# Patient Record
Sex: MALE | Race: AMERICAN INDIAN OR ALASKA NATIVE | Employment: UNEMPLOYED | URBAN - METROPOLITAN AREA
[De-identification: names, ages, dates, MRNs, and addresses within clinical notes are randomized per-mention and may not be internally consistent; named-entity substitution may affect disease eponyms.]

---

## 2017-01-23 ENCOUNTER — ANESTHESIA EVENT (OUTPATIENT)
Dept: SURGERY | Age: 57
End: 2017-01-23
Payer: MEDICARE

## 2017-01-23 RX ORDER — BISMUTH SUBSALICYLATE 262 MG
1 TABLET,CHEWABLE ORAL DAILY
COMMUNITY

## 2017-01-24 ENCOUNTER — HOSPITAL ENCOUNTER (OUTPATIENT)
Age: 57
Setting detail: OBSERVATION
LOS: 1 days | Discharge: HOME | End: 2017-01-25
Attending: ORTHOPAEDIC SURGERY | Admitting: ORTHOPAEDIC SURGERY
Payer: MEDICARE

## 2017-01-24 ENCOUNTER — ANESTHESIA (OUTPATIENT)
Dept: SURGERY | Age: 57
End: 2017-01-24
Payer: MEDICARE

## 2017-01-24 ENCOUNTER — APPOINTMENT (OUTPATIENT)
Dept: GENERAL RADIOLOGY | Age: 57
End: 2017-01-24
Attending: ORTHOPAEDIC SURGERY
Payer: MEDICARE

## 2017-01-24 PROCEDURE — 77030018836 HC SOL IRR NACL ICUM -A: Performed by: ORTHOPAEDIC SURGERY

## 2017-01-24 PROCEDURE — 74011250637 HC RX REV CODE- 250/637: Performed by: ORTHOPAEDIC SURGERY

## 2017-01-24 PROCEDURE — 77030013079 HC BLNKT BAIR HGGR 3M -A: Performed by: ANESTHESIOLOGY

## 2017-01-24 PROCEDURE — 77030027138 HC INCENT SPIROMETER -A

## 2017-01-24 PROCEDURE — 74011250636 HC RX REV CODE- 250/636: Performed by: ORTHOPAEDIC SURGERY

## 2017-01-24 PROCEDURE — 76060000035 HC ANESTHESIA 2 TO 2.5 HR: Performed by: ORTHOPAEDIC SURGERY

## 2017-01-24 PROCEDURE — 74011250636 HC RX REV CODE- 250/636: Performed by: NURSE ANESTHETIST, CERTIFIED REGISTERED

## 2017-01-24 PROCEDURE — 74011250637 HC RX REV CODE- 250/637: Performed by: NURSE ANESTHETIST, CERTIFIED REGISTERED

## 2017-01-24 PROCEDURE — 77030003028 HC SUT VCRL J&J -A: Performed by: ORTHOPAEDIC SURGERY

## 2017-01-24 PROCEDURE — 77030018723 HC ELCTRD BLD COVD -A: Performed by: ORTHOPAEDIC SURGERY

## 2017-01-24 PROCEDURE — 77010033678 HC OXYGEN DAILY

## 2017-01-24 PROCEDURE — 99218 HC RM OBSERVATION: CPT

## 2017-01-24 PROCEDURE — 76210000016 HC OR PH I REC 1 TO 1.5 HR: Performed by: ORTHOPAEDIC SURGERY

## 2017-01-24 PROCEDURE — 77030031139 HC SUT VCRL2 J&J -A: Performed by: ORTHOPAEDIC SURGERY

## 2017-01-24 PROCEDURE — 77030032490 HC SLV COMPR SCD KNE COVD -B: Performed by: ORTHOPAEDIC SURGERY

## 2017-01-24 PROCEDURE — 77030018823 HC SLV COMPR VENO -B: Performed by: ORTHOPAEDIC SURGERY

## 2017-01-24 PROCEDURE — 77030014647 HC SEAL FBRN TISSL BAXT -D: Performed by: ORTHOPAEDIC SURGERY

## 2017-01-24 PROCEDURE — 77030003029 HC SUT VCRL J&J -B: Performed by: ORTHOPAEDIC SURGERY

## 2017-01-24 PROCEDURE — 74011250637 HC RX REV CODE- 250/637: Performed by: HOSPITALIST

## 2017-01-24 PROCEDURE — 77030011640 HC PAD GRND REM COVD -A: Performed by: ORTHOPAEDIC SURGERY

## 2017-01-24 PROCEDURE — 77030008683 HC TU ET CUF COVD -A: Performed by: ANESTHESIOLOGY

## 2017-01-24 PROCEDURE — 77030008477 HC STYL SATN SLP COVD -A: Performed by: ANESTHESIOLOGY

## 2017-01-24 PROCEDURE — 74011000258 HC RX REV CODE- 258: Performed by: ORTHOPAEDIC SURGERY

## 2017-01-24 PROCEDURE — 74011000250 HC RX REV CODE- 250: Performed by: ORTHOPAEDIC SURGERY

## 2017-01-24 PROCEDURE — 76010000131 HC OR TIME 2 TO 2.5 HR: Performed by: ORTHOPAEDIC SURGERY

## 2017-01-24 PROCEDURE — 77030004391 HC BUR FLUT MEDT -C: Performed by: ORTHOPAEDIC SURGERY

## 2017-01-24 PROCEDURE — 77030019908 HC STETH ESOPH SIMS -A: Performed by: ANESTHESIOLOGY

## 2017-01-24 PROCEDURE — 74011250636 HC RX REV CODE- 250/636

## 2017-01-24 RX ORDER — HYDROMORPHONE HYDROCHLORIDE 2 MG/ML
0.5 INJECTION, SOLUTION INTRAMUSCULAR; INTRAVENOUS; SUBCUTANEOUS
Status: DISCONTINUED | OUTPATIENT
Start: 2017-01-24 | End: 2017-01-24 | Stop reason: HOSPADM

## 2017-01-24 RX ORDER — DIAZEPAM 5 MG/1
10 TABLET ORAL ONCE
Status: COMPLETED | OUTPATIENT
Start: 2017-01-24 | End: 2017-01-24

## 2017-01-24 RX ORDER — CARVEDILOL 6.25 MG/1
6.25 TABLET ORAL 2 TIMES DAILY WITH MEALS
Status: DISCONTINUED | OUTPATIENT
Start: 2017-01-25 | End: 2017-01-25 | Stop reason: HOSPADM

## 2017-01-24 RX ORDER — ONDANSETRON 2 MG/ML
4 INJECTION INTRAMUSCULAR; INTRAVENOUS
Status: DISCONTINUED | OUTPATIENT
Start: 2017-01-24 | End: 2017-01-25 | Stop reason: HOSPADM

## 2017-01-24 RX ORDER — SUCCINYLCHOLINE CHLORIDE 20 MG/ML
INJECTION INTRAMUSCULAR; INTRAVENOUS AS NEEDED
Status: DISCONTINUED | OUTPATIENT
Start: 2017-01-24 | End: 2017-01-24 | Stop reason: HOSPADM

## 2017-01-24 RX ORDER — ALBUTEROL SULFATE 90 UG/1
2 AEROSOL, METERED RESPIRATORY (INHALATION)
Status: ON HOLD | COMMUNITY
End: 2017-01-24

## 2017-01-24 RX ORDER — FENTANYL CITRATE 50 UG/ML
50 INJECTION, SOLUTION INTRAMUSCULAR; INTRAVENOUS AS NEEDED
Status: DISCONTINUED | OUTPATIENT
Start: 2017-01-24 | End: 2017-01-24 | Stop reason: HOSPADM

## 2017-01-24 RX ORDER — DEXTROAMPHETAMINE SACCHARATE, AMPHETAMINE ASPARTATE, DEXTROAMPHETAMINE SULFATE AND AMPHETAMINE SULFATE 2.5; 2.5; 2.5; 2.5 MG/1; MG/1; MG/1; MG/1
15 TABLET ORAL 3 TIMES DAILY
COMMUNITY

## 2017-01-24 RX ORDER — SODIUM CHLORIDE 0.9 % (FLUSH) 0.9 %
5-10 SYRINGE (ML) INJECTION EVERY 8 HOURS
Status: DISCONTINUED | OUTPATIENT
Start: 2017-01-24 | End: 2017-01-25 | Stop reason: HOSPADM

## 2017-01-24 RX ORDER — SODIUM CHLORIDE, SODIUM LACTATE, POTASSIUM CHLORIDE, CALCIUM CHLORIDE 600; 310; 30; 20 MG/100ML; MG/100ML; MG/100ML; MG/100ML
75 INJECTION, SOLUTION INTRAVENOUS CONTINUOUS
Status: DISCONTINUED | OUTPATIENT
Start: 2017-01-24 | End: 2017-01-25

## 2017-01-24 RX ORDER — HYDROMORPHONE HYDROCHLORIDE 1 MG/ML
2 INJECTION, SOLUTION INTRAMUSCULAR; INTRAVENOUS; SUBCUTANEOUS
Status: DISCONTINUED | OUTPATIENT
Start: 2017-01-24 | End: 2017-01-25

## 2017-01-24 RX ORDER — DEXTROAMPHETAMINE SACCHARATE, AMPHETAMINE ASPARTATE MONOHYDRATE, DEXTROAMPHETAMINE SULFATE AND AMPHETAMINE SULFATE 2.5; 2.5; 2.5; 2.5 MG/1; MG/1; MG/1; MG/1
10 CAPSULE, EXTENDED RELEASE ORAL 2 TIMES DAILY
Status: DISCONTINUED | OUTPATIENT
Start: 2017-01-25 | End: 2017-01-25 | Stop reason: HOSPADM

## 2017-01-24 RX ORDER — FENTANYL CITRATE 50 UG/ML
INJECTION, SOLUTION INTRAMUSCULAR; INTRAVENOUS AS NEEDED
Status: DISCONTINUED | OUTPATIENT
Start: 2017-01-24 | End: 2017-01-24 | Stop reason: HOSPADM

## 2017-01-24 RX ORDER — CARBAMAZEPINE 200 MG/1
200 TABLET ORAL 3 TIMES DAILY
Status: DISCONTINUED | OUTPATIENT
Start: 2017-01-25 | End: 2017-01-25 | Stop reason: HOSPADM

## 2017-01-24 RX ORDER — LISINOPRIL 40 MG/1
40 TABLET ORAL DAILY
Status: DISCONTINUED | OUTPATIENT
Start: 2017-01-25 | End: 2017-01-25 | Stop reason: HOSPADM

## 2017-01-24 RX ORDER — LIDOCAINE HYDROCHLORIDE 20 MG/ML
INJECTION, SOLUTION EPIDURAL; INFILTRATION; INTRACAUDAL; PERINEURAL AS NEEDED
Status: DISCONTINUED | OUTPATIENT
Start: 2017-01-24 | End: 2017-01-24 | Stop reason: HOSPADM

## 2017-01-24 RX ORDER — GLYCOPYRROLATE 0.2 MG/ML
INJECTION INTRAMUSCULAR; INTRAVENOUS AS NEEDED
Status: DISCONTINUED | OUTPATIENT
Start: 2017-01-24 | End: 2017-01-24 | Stop reason: HOSPADM

## 2017-01-24 RX ORDER — FAMOTIDINE 20 MG/1
20 TABLET, FILM COATED ORAL ONCE
Status: COMPLETED | OUTPATIENT
Start: 2017-01-24 | End: 2017-01-24

## 2017-01-24 RX ORDER — BUPIVACAINE HYDROCHLORIDE 5 MG/ML
INJECTION, SOLUTION EPIDURAL; INTRACAUDAL AS NEEDED
Status: DISCONTINUED | OUTPATIENT
Start: 2017-01-24 | End: 2017-01-24 | Stop reason: HOSPADM

## 2017-01-24 RX ORDER — OXYCODONE AND ACETAMINOPHEN 10; 325 MG/1; MG/1
2 TABLET ORAL
Status: DISCONTINUED | OUTPATIENT
Start: 2017-01-24 | End: 2017-01-25 | Stop reason: HOSPADM

## 2017-01-24 RX ORDER — ONDANSETRON 2 MG/ML
INJECTION INTRAMUSCULAR; INTRAVENOUS AS NEEDED
Status: DISCONTINUED | OUTPATIENT
Start: 2017-01-24 | End: 2017-01-24 | Stop reason: HOSPADM

## 2017-01-24 RX ORDER — SODIUM CHLORIDE, SODIUM LACTATE, POTASSIUM CHLORIDE, CALCIUM CHLORIDE 600; 310; 30; 20 MG/100ML; MG/100ML; MG/100ML; MG/100ML
50 INJECTION, SOLUTION INTRAVENOUS CONTINUOUS
Status: DISCONTINUED | OUTPATIENT
Start: 2017-01-24 | End: 2017-01-24 | Stop reason: HOSPADM

## 2017-01-24 RX ORDER — NEOSTIGMINE METHYLSULFATE 5 MG/5 ML
SYRINGE (ML) INTRAVENOUS AS NEEDED
Status: DISCONTINUED | OUTPATIENT
Start: 2017-01-24 | End: 2017-01-24 | Stop reason: HOSPADM

## 2017-01-24 RX ORDER — TRAMADOL HYDROCHLORIDE 50 MG/1
50 TABLET ORAL DAILY
COMMUNITY
End: 2017-07-07

## 2017-01-24 RX ORDER — DEXAMETHASONE SODIUM PHOSPHATE 4 MG/ML
INJECTION, SOLUTION INTRA-ARTICULAR; INTRALESIONAL; INTRAMUSCULAR; INTRAVENOUS; SOFT TISSUE AS NEEDED
Status: DISCONTINUED | OUTPATIENT
Start: 2017-01-24 | End: 2017-01-24 | Stop reason: HOSPADM

## 2017-01-24 RX ORDER — THERA TABS 400 MCG
1 TAB ORAL DAILY
Status: DISCONTINUED | OUTPATIENT
Start: 2017-01-25 | End: 2017-01-25 | Stop reason: HOSPADM

## 2017-01-24 RX ORDER — DEXTROSE, SODIUM CHLORIDE, AND POTASSIUM CHLORIDE 5; .45; .15 G/100ML; G/100ML; G/100ML
100 INJECTION INTRAVENOUS CONTINUOUS
Status: DISCONTINUED | OUTPATIENT
Start: 2017-01-24 | End: 2017-01-25

## 2017-01-24 RX ORDER — SODIUM CHLORIDE 0.9 % (FLUSH) 0.9 %
5-10 SYRINGE (ML) INJECTION AS NEEDED
Status: DISCONTINUED | OUTPATIENT
Start: 2017-01-24 | End: 2017-01-25 | Stop reason: HOSPADM

## 2017-01-24 RX ORDER — PROPOFOL 10 MG/ML
INJECTION, EMULSION INTRAVENOUS AS NEEDED
Status: DISCONTINUED | OUTPATIENT
Start: 2017-01-24 | End: 2017-01-24 | Stop reason: HOSPADM

## 2017-01-24 RX ORDER — CEFAZOLIN SODIUM 2 G/50ML
2 SOLUTION INTRAVENOUS
Status: COMPLETED | OUTPATIENT
Start: 2017-01-24 | End: 2017-01-24

## 2017-01-24 RX ORDER — VANCOMYCIN HYDROCHLORIDE 1 G/20ML
INJECTION, POWDER, LYOPHILIZED, FOR SOLUTION INTRAVENOUS AS NEEDED
Status: DISCONTINUED | OUTPATIENT
Start: 2017-01-24 | End: 2017-01-24 | Stop reason: HOSPADM

## 2017-01-24 RX ORDER — ROCURONIUM BROMIDE 10 MG/ML
INJECTION, SOLUTION INTRAVENOUS AS NEEDED
Status: DISCONTINUED | OUTPATIENT
Start: 2017-01-24 | End: 2017-01-24 | Stop reason: HOSPADM

## 2017-01-24 RX ORDER — HYDROCODONE BITARTRATE AND ACETAMINOPHEN 10; 325 MG/1; MG/1
1 TABLET ORAL
COMMUNITY
End: 2017-07-07

## 2017-01-24 RX ORDER — MIDAZOLAM HYDROCHLORIDE 1 MG/ML
INJECTION, SOLUTION INTRAMUSCULAR; INTRAVENOUS AS NEEDED
Status: DISCONTINUED | OUTPATIENT
Start: 2017-01-24 | End: 2017-01-24 | Stop reason: HOSPADM

## 2017-01-24 RX ORDER — ALBUTEROL SULFATE 90 UG/1
2 AEROSOL, METERED RESPIRATORY (INHALATION)
COMMUNITY

## 2017-01-24 RX ORDER — HYDROMORPHONE HYDROCHLORIDE 2 MG/ML
INJECTION, SOLUTION INTRAMUSCULAR; INTRAVENOUS; SUBCUTANEOUS AS NEEDED
Status: DISCONTINUED | OUTPATIENT
Start: 2017-01-24 | End: 2017-01-24 | Stop reason: HOSPADM

## 2017-01-24 RX ORDER — ACETAMINOPHEN 325 MG/1
650 TABLET ORAL
Status: DISCONTINUED | OUTPATIENT
Start: 2017-01-24 | End: 2017-01-25 | Stop reason: HOSPADM

## 2017-01-24 RX ADMIN — HYDROMORPHONE HYDROCHLORIDE 1 MG: 2 INJECTION, SOLUTION INTRAMUSCULAR; INTRAVENOUS; SUBCUTANEOUS at 13:11

## 2017-01-24 RX ADMIN — HYDROMORPHONE HYDROCHLORIDE 0.5 MG: 2 INJECTION INTRAMUSCULAR; INTRAVENOUS; SUBCUTANEOUS at 16:05

## 2017-01-24 RX ADMIN — PROPOFOL 200 MG: 10 INJECTION, EMULSION INTRAVENOUS at 13:17

## 2017-01-24 RX ADMIN — ROCURONIUM BROMIDE 50 MG: 10 INJECTION, SOLUTION INTRAVENOUS at 13:25

## 2017-01-24 RX ADMIN — GLYCOPYRROLATE 0.4 MG: 0.2 INJECTION INTRAMUSCULAR; INTRAVENOUS at 15:19

## 2017-01-24 RX ADMIN — HYDROMORPHONE HYDROCHLORIDE 1 MG: 2 INJECTION, SOLUTION INTRAMUSCULAR; INTRAVENOUS; SUBCUTANEOUS at 13:16

## 2017-01-24 RX ADMIN — HYDROMORPHONE HYDROCHLORIDE 2 MG: 1 INJECTION, SOLUTION INTRAMUSCULAR; INTRAVENOUS; SUBCUTANEOUS at 20:44

## 2017-01-24 RX ADMIN — SODIUM CHLORIDE, SODIUM LACTATE, POTASSIUM CHLORIDE, AND CALCIUM CHLORIDE 75 ML/HR: 600; 310; 30; 20 INJECTION, SOLUTION INTRAVENOUS at 11:04

## 2017-01-24 RX ADMIN — FENTANYL CITRATE 50 MCG: 50 INJECTION, SOLUTION INTRAMUSCULAR; INTRAVENOUS at 15:40

## 2017-01-24 RX ADMIN — DIAZEPAM 10 MG: 5 TABLET ORAL at 22:41

## 2017-01-24 RX ADMIN — FENTANYL CITRATE 100 MCG: 50 INJECTION, SOLUTION INTRAMUSCULAR; INTRAVENOUS at 15:35

## 2017-01-24 RX ADMIN — ONDANSETRON 4 MG: 2 INJECTION INTRAMUSCULAR; INTRAVENOUS at 13:14

## 2017-01-24 RX ADMIN — SUCCINYLCHOLINE CHLORIDE 100 MG: 20 INJECTION INTRAMUSCULAR; INTRAVENOUS at 13:17

## 2017-01-24 RX ADMIN — Medication 3 MG: at 15:19

## 2017-01-24 RX ADMIN — FAMOTIDINE 20 MG: 20 TABLET ORAL at 10:59

## 2017-01-24 RX ADMIN — ROCURONIUM BROMIDE 20 MG: 10 INJECTION, SOLUTION INTRAVENOUS at 13:45

## 2017-01-24 RX ADMIN — FENTANYL CITRATE 100 MCG: 50 INJECTION, SOLUTION INTRAMUSCULAR; INTRAVENOUS at 15:44

## 2017-01-24 RX ADMIN — CEFAZOLIN SODIUM 2 G: 2 SOLUTION INTRAVENOUS at 13:31

## 2017-01-24 RX ADMIN — OXYCODONE HYDROCHLORIDE AND ACETAMINOPHEN 2 TABLET: 10; 325 TABLET ORAL at 18:15

## 2017-01-24 RX ADMIN — CEFAZOLIN SODIUM 1 G: 1 INJECTION, POWDER, FOR SOLUTION INTRAMUSCULAR; INTRAVENOUS at 17:51

## 2017-01-24 RX ADMIN — ROCURONIUM BROMIDE 10 MG: 10 INJECTION, SOLUTION INTRAVENOUS at 14:01

## 2017-01-24 RX ADMIN — HYDROMORPHONE HYDROCHLORIDE 0.5 MG: 2 INJECTION INTRAMUSCULAR; INTRAVENOUS; SUBCUTANEOUS at 16:20

## 2017-01-24 RX ADMIN — HYDROMORPHONE HYDROCHLORIDE 0.5 MG: 2 INJECTION INTRAMUSCULAR; INTRAVENOUS; SUBCUTANEOUS at 16:35

## 2017-01-24 RX ADMIN — ROCURONIUM BROMIDE 5 MG: 10 INJECTION, SOLUTION INTRAVENOUS at 13:17

## 2017-01-24 RX ADMIN — DEXTROSE MONOHYDRATE, SODIUM CHLORIDE, AND POTASSIUM CHLORIDE 100 ML/HR: 50; 4.5; 1.49 INJECTION, SOLUTION INTRAVENOUS at 17:51

## 2017-01-24 RX ADMIN — ROCURONIUM BROMIDE 15 MG: 10 INJECTION, SOLUTION INTRAVENOUS at 14:31

## 2017-01-24 RX ADMIN — Medication 10 ML: at 17:52

## 2017-01-24 RX ADMIN — MIDAZOLAM HYDROCHLORIDE 2 MG: 1 INJECTION, SOLUTION INTRAMUSCULAR; INTRAVENOUS at 13:08

## 2017-01-24 RX ADMIN — Medication 10 ML: at 22:00

## 2017-01-24 RX ADMIN — DEXAMETHASONE SODIUM PHOSPHATE 10 MG: 4 INJECTION, SOLUTION INTRA-ARTICULAR; INTRALESIONAL; INTRAMUSCULAR; INTRAVENOUS; SOFT TISSUE at 13:14

## 2017-01-24 RX ADMIN — LIDOCAINE HYDROCHLORIDE 40 MG: 20 INJECTION, SOLUTION EPIDURAL; INFILTRATION; INTRACAUDAL; PERINEURAL at 13:17

## 2017-01-24 NOTE — ROUTINE PROCESS
The patient is in stable condition. Family at the bedside. Admission database reviewed and completed with the patient. I called the [facility] to get the dosages for the patients medication.

## 2017-01-24 NOTE — IP AVS SNAPSHOT
Summary of Care Report The Summary of Care report has been created to help improve care coordination. Users with access to [email or web address] or [address] (Web-based application) may access additional patient information including the Discharge Summary. If you are not currently a [address] user and need more information, please call the number listed below in the [place] [address] section and ask to be connected with Medical Records. Facility Information Name Address Phone [address] [facility] [address] [facility] [address] [id number] [phone] Patient Information Patient Name Sex  Sima Cullen ([id number]) Male 1960 Discharge Information Admitting Provider Service Area Unit Cynthia Barry MD / [place] [address] [facility] / [phone] Discharge Provider Discharge Date/Time Discharge Disposition Destination (none) 2017 (Pending) AHR (none) Patient Language Language ENGLISH [13] You are allergic to the following No active allergies Current Discharge Medication List  
  
CONTINUE these medications which have NOT CHANGED Dose & Instructions Dispensing Information Comments ADDERALL 10 mg tablet Generic drug:  dextroamphetamine-amphetamine Dose:  10 mg Take 10 mg by mouth two (2) times a day. Refills:  0  
   
 CARBATROL PO Dose:  200 mg Take 200 mg by mouth three (3) times daily. Refills:  0  
   
 CARVEDILOL PO Dose:  6.25 mg Take 6.25 mg by mouth two (2) times a day. Refills:  0 DIAZEPAM PO Dose:  10 mg Take 10 mg by mouth daily. Refills:  0  
   
 ELIQUIS PO Dose:  5 mg Take 5 mg by mouth two (2) times a day. Refills:  0  
   
 ibuprofen 100 mg tablet Dose:  800 mg Take 800 mg by mouth daily. Refills:  0 LISINOPRIL PO  Dose:  40 mg  
 Take 40 mg by mouth daily. Refills:  0  
   
 multivitamin tablet Commonly known as:  ONE A DAY Dose:  1 Tab Take 1 Tab by mouth daily. Refills:  0 NORCO  mg tablet Generic drug:  HYDROcodone-acetaminophen Dose:  1 Tab Take 1 Tab by mouth. Refills:  0  
   
 traMADol 50 mg tablet Commonly known as:  ULTRAM  
 Dose:  50 mg Take 50 mg by mouth daily. Refills:  0 VENTOLIN HFA 90 mcg/actuation inhaler Generic drug:  albuterol Dose:  2 Puff Take 2 Puffs by inhalation every four (4) hours as needed for Wheezing. Refills:  0 Surgery Information ID Date/Time Status Primary Surgeon All Procedures Location [id number] 1/24/2017 MIKE Ennis MD DECOMPRESSION L4-5, 5-S1 [facility] MAIN OR Follow-up Information Follow up With Details Comments Contact Info Nicci Francis MD   Patient can only remember the practice name and not the physician Discharge Instructions DISCHARGE SUMMARY from Nurse The following personal items are in your possession at time of discharge: 
 
Dental Appliances: Uppers Visual Aid: None PATIENT INSTRUCTIONS: 
 
 
F-face looks uneven A-arms unable to move or move unevenly S-speech slurred or non-existent T-time-call 911 as soon as signs and symptoms begin-DO NOT go Back to bed or wait to see if you get better-TIME IS BRAIN. Warning Signs of HEART ATTACK Call 911 if you have these symptoms: 
? Chest discomfort. Most heart attacks involve discomfort in the center of the chest that lasts more than a few minutes, or that goes away and comes back. It can feel like uncomfortable pressure, squeezing, fullness, or pain. ? Discomfort in other areas of the upper body. Symptoms can include pain or discomfort in one or both arms, the back, neck, jaw, or stomach. ? Shortness of breath with or without chest discomfort. ? Other signs may include breaking out in a cold sweat, nausea, or lightheadedness. Don't wait more than five minutes to call [address]! Fast action can save your life. Calling 911 is almost always the fastest way to get lifesaving treatment. Emergency Medical Services staff can begin treatment when they arrive  up to an hour sooner than if someone gets to the hospital by car. The discharge information has been reviewed with the patient. The patient verbalized understanding. Discharge medications reviewed with the patient and appropriate educational materials and side effects teaching were provided. Patient armband removed and shredded Chart Review Routing History No Routing History on File

## 2017-01-24 NOTE — IP AVS SNAPSHOT
Current Discharge Medication List  
  
Take these medications at their scheduled times Dose & Instructions Dispensing Information Comments Morning Noon Evening Bedtime ADDERALL 10 mg tablet Generic drug:  dextroamphetamine-amphetamine Your next dose is: Today, Tomorrow Other:  ____________ Dose:  10 mg Take 10 mg by mouth two (2) times a day. Refills:  0  
     
   
   
   
  
 CARBATROL PO Your next dose is: Today, Tomorrow Other:  ____________ Dose:  200 mg Take 200 mg by mouth three (3) times daily. Refills:  0  
     
   
   
   
  
 CARVEDILOL PO Your next dose is: Today, Tomorrow Other:  ____________ Dose:  6.25 mg Take 6.25 mg by mouth two (2) times a day. Refills:  0 DIAZEPAM PO Your next dose is: Today, Tomorrow Other:  ____________ Dose:  10 mg Take 10 mg by mouth daily. Refills:  0  
     
   
   
   
  
 ELIQUIS PO Your next dose is: Today, Tomorrow Other:  ____________ Dose:  5 mg Take 5 mg by mouth two (2) times a day. Refills:  0  
     
   
   
   
  
 ibuprofen 100 mg tablet Your next dose is: Today, Tomorrow Other:  ____________ Dose:  800 mg Take 800 mg by mouth daily. Refills:  0 LISINOPRIL PO Your next dose is: Today, Tomorrow Other:  ____________ Dose:  40 mg Take 40 mg by mouth daily. Refills:  0  
     
   
   
   
  
 multivitamin tablet Commonly known as:  ONE A DAY Your next dose is: Today, Tomorrow Other:  ____________ Dose:  1 Tab Take 1 Tab by mouth daily. Refills:  0  
     
   
   
   
  
 traMADol 50 mg tablet Commonly known as:  ULTRAM  
   
Your next dose is: Today, Tomorrow Other:  ____________ Dose:  50 mg Take 50 mg by mouth daily. Refills:  0 Take these medications as needed Dose & Instructions Dispensing Information Comments Morning Noon Evening Bedtime VENTOLIN HFA 90 mcg/actuation inhaler Generic drug:  albuterol Your next dose is: Today, Tomorrow Other:  ____________ Dose:  2 Puff Take 2 Puffs by inhalation every four (4) hours as needed for Wheezing. Refills:  0 Take these medications as directed Dose & Instructions Dispensing Information Comments Morning Noon Evening Bedtime NORCO  mg tablet Generic drug:  HYDROcodone-acetaminophen Your next dose is: Today, Tomorrow Other:  ____________ Dose:  1 Tab Take 1 Tab by mouth. Refills:  0

## 2017-01-24 NOTE — ANESTHESIA PREPROCEDURE EVALUATION
Anesthetic History   No history of anesthetic complications            Review of Systems / Medical History  Patient summary reviewed and pertinent labs reviewed    Pulmonary            Asthma : well controlled       Neuro/Psych     seizures: well controlled         Cardiovascular    Hypertension        Dysrhythmias       Exercise tolerance: >4 METS     GI/Hepatic/Renal  Within defined limits              Endo/Other        Arthritis     Other Findings   Comments: Current Smoker? NO       Elective Surgery? Yes       Abstained from smoking 24 hours prior to anesthesia? N/A    Risk Factors for Postoperative nausea/vomiting:       History of postoperative nausea/vomiting? NO       Female? NO       Motion sickness? NO       Intended opioid administration for postoperative analgesia?   YES         Physical Exam    Airway  Mallampati: III  TM Distance: 4 - 6 cm  Neck ROM: decreased range of motion   Mouth opening: Normal     Cardiovascular    Rhythm: regular  Rate: normal         Dental    Dentition: Full upper dentures     Pulmonary  Breath sounds clear to auscultation               Abdominal  GI exam deferred       Other Findings            Anesthetic Plan    ASA: 3  Anesthesia type: general          Induction: Intravenous  Anesthetic plan and risks discussed with: Patient

## 2017-01-24 NOTE — IP AVS SNAPSHOT
[address] Patient: Shell Montes MRN: [id number] :1960 You are allergic to the following No active allergies Recent Documentation Height Weight BMI Smoking Status 1.778 m 102.5 kg 32.43 kg/m2 Never Smoker Emergency Contacts Name Discharge Info Relation Home Work Mobile Zoe Dickinson DISCHARGE CAREGIVER [3] Spouse [3] [phone] About your hospitalization You were admitted on:  2017 You last received care in the:  [address] [place] [address],[address] You were discharged on:  2017 Unit phone number:  [phone] Why you were hospitalized Your primary diagnosis was:  Not on File Providers Seen During Your Hospitalizations Provider Role Specialty Primary office phone Kristin Cooley MD Attending Provider Orthopedic Surgery [phone] Your Primary Care Physician (PCP) Primary Care Physician Office Phone Office Fax OTHER, PHYS ** None ** ** None ** Follow-up Information Follow up With Details Comments Contact Info Nicci Francis MD   Patient can only remember the practice name and not the physician Current Discharge Medication List  
  
CONTINUE these medications which have NOT CHANGED Dose & Instructions Dispensing Information Comments Morning Noon Evening Bedtime ADDERALL 10 mg tablet Generic drug:  dextroamphetamine-amphetamine Your next dose is: Today, Tomorrow Other:  _________ Dose:  10 mg Take 10 mg by mouth two (2) times a day. Refills:  0  
     
   
   
   
  
 CARBATROL PO Your next dose is: Today, Tomorrow Other:  _________ Dose:  200 mg Take 200 mg by mouth three (3) times daily. Refills:  0  
     
   
   
   
  
 CARVEDILOL PO Your next dose is: Today, Tomorrow Other:  _________ Dose:  6.25 mg Take 6.25 mg by mouth two (2) times a day. Refills:  0 DIAZEPAM PO Your next dose is: Today, Tomorrow Other:  _________ Dose:  10 mg Take 10 mg by mouth daily. Refills:  0  
     
   
   
   
  
 ELIQUIS PO Your next dose is: Today, Tomorrow Other:  _________ Dose:  5 mg Take 5 mg by mouth two (2) times a day. Refills:  0  
     
   
   
   
  
 ibuprofen 100 mg tablet Your next dose is: Today, Tomorrow Other:  _________ Dose:  800 mg Take 800 mg by mouth daily. Refills:  0 LISINOPRIL PO Your next dose is: Today, Tomorrow Other:  _________ Dose:  40 mg Take 40 mg by mouth daily. Refills:  0  
     
   
   
   
  
 multivitamin tablet Commonly known as:  ONE A DAY Your next dose is: Today, Tomorrow Other:  _________ Dose:  1 Tab Take 1 Tab by mouth daily. Refills:  0 NORCO  mg tablet Generic drug:  HYDROcodone-acetaminophen Your next dose is: Today, Tomorrow Other:  _________ Dose:  1 Tab Take 1 Tab by mouth. Refills:  0  
     
   
   
   
  
 traMADol 50 mg tablet Commonly known as:  ULTRAM  
   
Your next dose is: Today, Tomorrow Other:  _________ Dose:  50 mg Take 50 mg by mouth daily. Refills:  0 VENTOLIN HFA 90 mcg/actuation inhaler Generic drug:  albuterol Your next dose is: Today, Tomorrow Other:  _________ Dose:  2 Puff Take 2 Puffs by inhalation every four (4) hours as needed for Wheezing. Refills:  0 Discharge Instructions DISCHARGE SUMMARY from Nurse The following personal items are in your possession at time of discharge: 
 
Dental Appliances: Uppers Visual Aid: None PATIENT INSTRUCTIONS: 
 
 
F-face looks uneven A-arms unable to move or move unevenly S-speech slurred or non-existent T-time-call 911 as soon as signs and symptoms begin-DO NOT go Back to bed or wait to see if you get better-TIME IS BRAIN. Warning Signs of HEART ATTACK Call 911 if you have these symptoms: 
? Chest discomfort. Most heart attacks involve discomfort in the center of the chest that lasts more than a few minutes, or that goes away and comes back. It can feel like uncomfortable pressure, squeezing, fullness, or pain. ? Discomfort in other areas of the upper body. Symptoms can include pain or discomfort in one or both arms, the back, neck, jaw, or stomach. ? Shortness of breath with or without chest discomfort. ? Other signs may include breaking out in a cold sweat, nausea, or lightheadedness. Don't wait more than five minutes to call [address]! Fast action can save your life. Calling 911 is almost always the fastest way to get lifesaving treatment. Emergency Medical Services staff can begin treatment when they arrive  up to an hour sooner than if someone gets to the hospital by car. The discharge information has been reviewed with the patient. The patient verbalized understanding. Discharge medications reviewed with the patient and appropriate educational materials and side effects teaching were provided. Patient armband removed and shredded Discharge Instructions Attachments/References LUMBAR LAMINECTOMY FOR SPINAL STENOSIS: POST-OP (ENGLISH) Discharge Orders None [facility] Announcement We are excited to announce that we are making your provider's discharge notes available to you in [employer]. You will see these notes when they are completed and signed by the physician that discharged you from your recent hospital stay.   If you have any questions or concerns about any information you see in Babble, please call the Health Information Department where you were seen or reach out to your Primary Care Provider for more information about your plan of care. Introducing [facility]! Areli Curran introduces Babble patient portal. Now you can access parts of your medical record, email your doctor's office, and request medication refills online. 1. In your internet browser, go to [email or web address] 2. Click on the First Time User? Click Here link in the Sign In box. You will see the New Member Sign Up page. 3. Enter your Babble Access Code exactly as it appears below. You will not need to use this code after youve completed the sign-up process. If you do not sign up before the expiration date, you must request a new code. · Babble Access Code: [email or web address] Expires: 3/23/2017  9:19 AM 
 
4. Enter the last four digits of your Social Security Number (xxxx) and Date of Birth (mm/dd/yyyy) as indicated and click Submit. You will be taken to the next sign-up page. 5. Create a Babble ID. This will be your Babble login ID and cannot be changed, so think of one that is secure and easy to remember. 6. Create a Babble password. You can change your password at any time. 7. Enter your Password Reset Question and Answer. This can be used at a later time if you forget your password. 8. Enter your e-mail address. You will receive e-mail notification when new information is available in [address] 9. Click Sign Up. You can now view and download portions of your medical record. 10. Click the Download Summary menu link to download a portable copy of your medical information. If you have questions, please visit the Frequently Asked Questions section of the Babble website. Remember, Babble is NOT to be used for urgent needs. For medical emergencies, dial 911. Now available from your iPhone and Android! General Information Please provide this summary of care documentation to your next provider. Patient Signature:  ____________________________________________________________ Date:  ____________________________________________________________  
  
Siena Cruz Provider Signature:  ____________________________________________________________ Date:  ____________________________________________________________ More Information Lumbar Laminectomy for Spinal Stenosis: What to Expect at Home Your Recovery You can expect your back to feel stiff or sore after surgery. This should improve in the weeks after surgery. You may have trouble sitting or standing in one position for very long and may need pain medicine in the weeks after your surgery. Your doctor may advise you to work with a physical therapist to strengthen the muscles around your spine and trunk. You will need to learn how to lift, twist, and bend so that you do not put too much strain on your back. This care sheet gives you a general idea about how long it will take for you to recover. But each person recovers at a different pace. Follow the steps below to get better as quickly as possible. How can you care for yourself at home? Activity · Rest when you feel tired. Getting enough sleep will help you recover. · Try to walk each day. Start by walking a little more than you did the day before. Bit by bit, increase the amount you walk. Walking boosts blood flow and helps prevent pneumonia and constipation. Walking may also decrease your muscle soreness after surgery. · If advised by your doctor, you may need to avoid lifting anything that would cause excessive strain on your back. This may include a child, heavy grocery bags and milk containers, a heavy briefcase or backpack, cat litter or dog food bags, or a vacuum .  
· Avoid strenuous activities, such as bicycle riding, jogging, weight lifting, or aerobic exercise, until your doctor says it is okay. · Do not drive for 2 to 4 weeks after your surgery or until your doctor says it is okay. · Avoid riding in a car for more than 30 minutes at a time for 2 to 4 weeks after surgery. If you must ride in a car for a longer distance, stop often to walk and stretch your legs. · Try to change your position about every 30 minutes while sitting or standing. This will help decrease your back pain while you are healing. · You will probably need to take 4 to 6 weeks off from work. It depends on the type of work you do and how you feel. · You may have sex as soon as you feel able, but avoid positions that put stress on your back or cause pain. Diet · You can eat your normal diet. If your stomach is upset, try bland, low-fat foods like plain rice, broiled chicken, toast, and yogurt. · Drink plenty of fluids (unless your doctor tells you not to). · You may notice that your bowel movements are not regular right after your surgery. This is common. Try to avoid constipation and straining with bowel movements. You may want to take a fiber supplement every day. If you have not had a bowel movement after a couple of days, ask your doctor about taking a mild laxative. Medicines · Your doctor will tell you if and when you can restart your medicines. He or she will also give you instructions about taking any new medicines. · If you take blood thinners, such as warfarin (Coumadin), clopidogrel (Plavix), or aspirin, be sure to talk to your doctor. He or she will tell you if and when to start taking those medicines again. Make sure that you understand exactly what your doctor wants you to do. · Take pain medicines exactly as directed. ¨ If the doctor gave you a prescription medicine for pain, take it as prescribed. ¨ If you are not taking a prescription pain medicine, ask your doctor if you can take an over-the-counter medicine. · If your doctor prescribed antibiotics, take them as directed. Do not stop taking them just because you feel better. You need to take the full course of antibiotics. · If you think your pain medicine is making you sick to your stomach: 
¨ Take your medicine after meals (unless your doctor has told you not to). ¨ Ask your doctor for a different pain medicine. Incision care · If you have strips of tape on the cut (incision) the doctor made, leave the tape on for a week or until it falls off. · Wash the area daily with warm, soapy water and pat it dry. · Keep the area clean and dry. You may cover it with a gauze bandage if it weeps or rubs against clothing. Change the bandage every day. Exercise · Do back exercises as instructed by your doctor. · Your doctor may advise you to work with a physical therapist to improve the strength and flexibility of your back. Other instructions · To reduce stiffness and help sore muscles, use a warm water bottle, a heating pad set on low, or a warm cloth on your back. Do not put heat right over the incision. Do not go to sleep with a heating pad on your skin. Follow-up care is a key part of your treatment and safety. Be sure to make and go to all appointments, and call your doctor if you are having problems. It's also a good idea to know your test results and keep a list of the medicines you take. When should you call for help? Call 911 anytime you think you may need emergency care. For example, call if: 
· You passed out (lost consciousness). · You have sudden chest pain and shortness of breath, or you cough up blood. · You are unable to move a leg at all. Call your doctor now or seek immediate medical care if: 
· You have new or worse symptoms in your legs or buttocks. Symptoms may include: ¨ Numbness or tingling. ¨ Weakness. ¨ Pain. · You lose bladder or bowel control. · You have loose stitches, or your incision comes open. · You have blood or fluid draining from the incision. · You have signs of infection, such as: 
¨ Increased pain, swelling, warmth, or redness. ¨ Pus draining from the incision. ¨ A fever. ¨ Red streaks leading from the incision. Watch closely for changes in your health, and be sure to contact your doctor if: 
· You do not have a bowel movement after taking a laxative. · You are not getting better as expected. Where can you learn more? Go to http://joseph-senait.info/. Enter V836 in the search box to learn more about \"Lumbar Laminectomy for Spinal Stenosis: What to Expect at Home. \" Current as of: June 30, 2016 Content Version: 11.1 © 4820-2374 [employer]. Care instructions adapted under license by [employer] website (which disclaims liability or warranty for this information). If you have questions about a medical condition or this instruction, always ask your healthcare professional. [place] [address] any warranty or liability for your use of this information.

## 2017-01-24 NOTE — PERIOP NOTES
Rec'd care of pt from OR via bed. Resp even and unlabored. Attached to monitor. OR, MAR and anesthesia report acknowledged. VSS. Will cont to monitor. 1704  TRANSFER - OUT REPORT:    Verbal report given to Cesar Allen RN (name) on Law Brizuela  being transferred to 2200 (unit) for routine post - op       Report consisted of patients Situation, Background, Assessment and   Recommendations(SBAR). Information from the following report(s) SBAR, Kardex, OR Summary, Intake/Output, MAR and Cardiac Rhythm sinus rhythm was reviewed with the receiving nurse. Lines:   Peripheral IV 01/24/17 Right Hand (Active)   Site Assessment Clean, dry, & intact 1/24/2017 11:05 AM   Phlebitis Assessment 0 1/24/2017 11:05 AM   Infiltration Assessment 0 1/24/2017 11:05 AM   Dressing Status Clean, dry, & intact 1/24/2017 11:05 AM   Dressing Type Transparent;Tape 1/24/2017 11:05 AM   Hub Color/Line Status Patent; Infusing;Pink 1/24/2017 11:05 AM        Opportunity for questions and clarification was provided.       Patient transported with:   [employer]

## 2017-01-24 NOTE — H&P
Day of Surgery Update:  Carly Campuzano was seen and examined. History and physical has been reviewed. The patient has been examined.  There have been no significant clinical changes since the completion of the originally dated History and Physical.    Signed By: Mayco Ibrahim MD     January 24, 2017 1:01 PM

## 2017-01-24 NOTE — ANESTHESIA POSTPROCEDURE EVALUATION
Post-Anesthesia Evaluation and Assessment    Patient: Rachele Morales MRN: [id number]  SSN: [id number]    YOB: 1960  Age: 55 y.o. Sex: male      Data from PACU flowsheet    Cardiovascular Function/Vital Signs  Visit Vitals    /62    Pulse 66    Temp 37.2 °C (98.9 °F)    Resp 15    Ht 5' 10\" (1.778 m)    Wt 102.5 kg (226 lb)    SpO2 96%    BMI 32.43 kg/m2       Patient is status post general anesthesia for Procedure(s):  DECOMPRESSION L4-5, 5-S1. Nausea/Vomiting: controlled    Postoperative hydration reviewed and adequate. Pain:  Pain Scale 1: Visual (01/24/17 1709)  Pain Intensity 1: 0 (01/24/17 1709)   Managed      Mental Status and Level of Consciousness: Alert and oriented     Pulmonary Status:   O2 Device: Oxygen mask (01/24/17 6725)   Adequate oxygenation and airway patent    Complications related to anesthesia: None    Post-anesthesia assessment completed.  No concerns    Signed By: Mikhail Church CRNA     January 24, 2017

## 2017-01-24 NOTE — PROGRESS NOTES
1800 patient in bed, call bell within reach,no distress noted. Skin assessment performed with witnessing MARK Manning surgical incision noted to back otherwise skin intact. Patient took personal seizure medication, teaching given prior to seizure med, patient refused stating \"I gotta take my second dose\", patient unaware home med dosages. Wife present at bedside number listed on communication board requesting to be contacted tomorrow in regards to discharge time due to patient lives in [address] [place] [address]. Patient oriented to floor and made aware of hourly rounding. [address] [facility] Marie Valdes made aware patient took seizure medication. Anna Manning put patient home meds into system. Per MD Marie Valdes patient can start home meds tomorrow excluding eliquis. Patient received dinner tray. Patient given ice water and cranberry juice per request.      Bedside and Verbal shift change report given to [address] [place] [address] [address] (oncoming nurse) by Eric Galvan RN (offgoing nurse). Report included the following information SBAR, Kardex and MAR.

## 2017-01-24 NOTE — ROUTINE PROCESS
TRANSFER - IN REPORT:    Verbal report received from [id number] (name) on Law Brizuela  being received from PACU(unit) for routine progression of care      Report consisted of patients Situation, Background, Assessment and   Recommendations(SBAR). Information from the following report(s) SBAR, Kardex and MAR was reviewed with the receiving nurse. Opportunity for questions and clarification was provided. Assessment completed upon patients arrival to unit and care assumed.

## 2017-01-24 NOTE — OP NOTES
BRIEF OPERATIVE NOTE    Date of Procedure: 1/24/2017     Preoperative Diagnosis: stenosis m48.06    Postoperative Diagnosis: stenosis m48.06      Procedure: Procedure(s):  DECOMPRESSION L4-5, 5-S1    Surgeon(s) and Role:     * Gopal Doss MD - Primary    Anesthesia: General    indings: stenosis l4-5>l5-s1     Estimated Blood Loss: 100  Replaced0      [id number]        Urine0    Specimens: * No specimens in log *     Tubes/Drains: None    Needle/sponge count:  Correct    Complications: 0    Plan    Up qat barbara  Home later today with percocet 10 if doing well. If not home in am  rto 2 wks.

## 2017-01-25 VITALS
SYSTOLIC BLOOD PRESSURE: 133 MMHG | HEART RATE: 68 BPM | HEIGHT: 70 IN | RESPIRATION RATE: 16 BRPM | TEMPERATURE: 98.3 F | BODY MASS INDEX: 32.35 KG/M2 | OXYGEN SATURATION: 100 % | WEIGHT: 226 LBS | DIASTOLIC BLOOD PRESSURE: 75 MMHG

## 2017-01-25 PROCEDURE — 99218 HC RM OBSERVATION: CPT

## 2017-01-25 PROCEDURE — 74011000250 HC RX REV CODE- 250

## 2017-01-25 PROCEDURE — 74011250636 HC RX REV CODE- 250/636: Performed by: ORTHOPAEDIC SURGERY

## 2017-01-25 PROCEDURE — 74011250637 HC RX REV CODE- 250/637: Performed by: ORTHOPAEDIC SURGERY

## 2017-01-25 PROCEDURE — 74011000258 HC RX REV CODE- 258: Performed by: ORTHOPAEDIC SURGERY

## 2017-01-25 PROCEDURE — 74011250636 HC RX REV CODE- 250/636

## 2017-01-25 PROCEDURE — G8978 MOBILITY CURRENT STATUS: HCPCS

## 2017-01-25 PROCEDURE — 74011250637 HC RX REV CODE- 250/637: Performed by: HOSPITALIST

## 2017-01-25 PROCEDURE — 97162 PT EVAL MOD COMPLEX 30 MIN: CPT

## 2017-01-25 PROCEDURE — G8979 MOBILITY GOAL STATUS: HCPCS

## 2017-01-25 PROCEDURE — G8980 MOBILITY D/C STATUS: HCPCS

## 2017-01-25 RX ORDER — IPRATROPIUM BROMIDE AND ALBUTEROL SULFATE 2.5; .5 MG/3ML; MG/3ML
3 SOLUTION RESPIRATORY (INHALATION)
Status: DISCONTINUED | OUTPATIENT
Start: 2017-01-25 | End: 2017-01-25 | Stop reason: HOSPADM

## 2017-01-25 RX ADMIN — CARBAMAZEPINE 200 MG: 200 TABLET ORAL at 08:26

## 2017-01-25 RX ADMIN — CARBAMAZEPINE 200 MG: 200 TABLET ORAL at 15:44

## 2017-01-25 RX ADMIN — OXYCODONE HYDROCHLORIDE AND ACETAMINOPHEN 2 TABLET: 10; 325 TABLET ORAL at 15:31

## 2017-01-25 RX ADMIN — THERA TABS 1 TABLET: TAB at 08:25

## 2017-01-25 RX ADMIN — HYDROMORPHONE HYDROCHLORIDE 2 MG: 1 INJECTION, SOLUTION INTRAMUSCULAR; INTRAVENOUS; SUBCUTANEOUS at 01:05

## 2017-01-25 RX ADMIN — OXYCODONE HYDROCHLORIDE AND ACETAMINOPHEN 2 TABLET: 10; 325 TABLET ORAL at 11:59

## 2017-01-25 RX ADMIN — DEXTROAMPHETAMINE SACCHARATE, AMPHETAMINE ASPARTATE, DEXTROAMPHETAMINE SULFATE AND AMPHETAMINE SULFATE 10 MG: 2.5; 2.5; 2.5; 2.5 CAPSULE, EXTENDED RELEASE ORAL at 08:25

## 2017-01-25 RX ADMIN — OXYCODONE HYDROCHLORIDE AND ACETAMINOPHEN 2 TABLET: 10; 325 TABLET ORAL at 07:01

## 2017-01-25 RX ADMIN — CEFAZOLIN SODIUM 1 G: 1 INJECTION, POWDER, FOR SOLUTION INTRAMUSCULAR; INTRAVENOUS at 01:05

## 2017-01-25 RX ADMIN — DEXTROSE MONOHYDRATE, SODIUM CHLORIDE, AND POTASSIUM CHLORIDE 100 ML/HR: 50; 4.5; 1.49 INJECTION, SOLUTION INTRAVENOUS at 01:05

## 2017-01-25 RX ADMIN — HYDROMORPHONE HYDROCHLORIDE 2 MG: 1 INJECTION, SOLUTION INTRAMUSCULAR; INTRAVENOUS; SUBCUTANEOUS at 11:16

## 2017-01-25 RX ADMIN — HYDROMORPHONE HYDROCHLORIDE 2 MG: 1 INJECTION, SOLUTION INTRAMUSCULAR; INTRAVENOUS; SUBCUTANEOUS at 08:23

## 2017-01-25 RX ADMIN — Medication 10 ML: at 06:00

## 2017-01-25 RX ADMIN — CEFAZOLIN SODIUM 1 G: 1 INJECTION, POWDER, FOR SOLUTION INTRAMUSCULAR; INTRAVENOUS at 11:11

## 2017-01-25 RX ADMIN — DEXTROAMPHETAMINE SACCHARATE, AMPHETAMINE ASPARTATE, DEXTROAMPHETAMINE SULFATE AND AMPHETAMINE SULFATE 10 MG: 2.5; 2.5; 2.5; 2.5 CAPSULE, EXTENDED RELEASE ORAL at 15:44

## 2017-01-25 RX ADMIN — LISINOPRIL 40 MG: 40 TABLET ORAL at 08:26

## 2017-01-25 RX ADMIN — HYDROMORPHONE HYDROCHLORIDE 2 MG: 1 INJECTION, SOLUTION INTRAMUSCULAR; INTRAVENOUS; SUBCUTANEOUS at 04:42

## 2017-01-25 RX ADMIN — CARVEDILOL 6.25 MG: 6.25 TABLET, FILM COATED ORAL at 08:26

## 2017-01-25 NOTE — ACP (ADVANCE CARE PLANNING)
Patient has designated ___his spouse_____________________ to participate in his/her discharge plan and to receive any needed information.      Name: James Thrasher  Address: [address] [place] [address]. [place], [place]. [address]  Phone number: [phone] / [phone]

## 2017-01-25 NOTE — PROGRESS NOTES
Internal Medicine Progress Note    Patient's Name: Ammon Lopez  Admit Date: 1/24/2017  Length of Stay: 1      Assessment/Plan     #Spinal Stenosis s/p DECOMPRESSION L4-5, 5-S1  #Paroxysysmal Atrial Fibrillation on Eliquis  #Seizure Disorder  #HTN  #Adult ADHD  #Insomnia  #DVT PPx     - Pain control PRN, cont to encourage use of PO. F/u ortho. PT/OT eval. D/c plan per ortho  - Remains in NSR. Hold eliquis s/p surgery. Ortho recs regarding when able to restart [facility] post procedure. Cont BB  - Cont home anti-epleptics  - BP stable in good range. Cont home regimen  - Cont adderall  - Cont valium qhs PRN  - SCDs      Subjective     Pt s/e @ bedside  Pt c/o pain throughout night and unable to sleep b/c of it  States pain medication did not go through IV and dripped out  Said dose he just got helps out some  No BM yet    Objective     Visit Vitals    /75 (BP 1 Location: Left arm, BP Patient Position: At rest)    Pulse 68    Temp 98.3 °F (36.8 °C)    Resp 16    Ht 5' 10\" (1.778 m)    Wt 102.5 kg (226 lb)    SpO2 100%    BMI 32.43 kg/m2       Physical Exam:  Gen: Pt uncomfortable, NAD  HEENT: NC/AT, EOMI  Neck: Supple, no JVD  Lungs: CTA, no w/r/r  Heart: RRR, no m/r/g  Abd: Soft, NT/ND, + BS  Ext: No cyanosis, no edema  Skin: Intact  Neuro: A&O, no focal deficts  Psych: Normal mood/affect    Intake and Output:  Current Shift:  01/25 0701 - 01/25 1900  In: -   Out: 1100 [Urine:1100]  Last three shifts:  01/23 1901 - 01/25 0700  In: 1360 [P.O.:360; I.V.:1000]  Out: 500 [Urine:400]    Lab/Data Reviewed:  No AM labs    Imaging Reviewed:  Nc Xr Technologist Service    Result Date: 1/25/2017  Fluoroscopy was used during surgery for this procedure under the supervision of the attending surgeon. Start Time:     1300 hours End Time:      1525 hours # of Images:  2       IMPRESSION:   Administrative report.   KL    Medications Reviewed:  Current Facility-Administered Medications   Medication Dose Route Frequency    sodium chloride (NS) flush 5-10 mL  5-10 mL IntraVENous Q8H    sodium chloride (NS) flush 5-10 mL  5-10 mL IntraVENous PRN    ceFAZolin (ANCEF) 1 g in 0.9% sodium chloride (MBP/ADV) 50 mL MBP  1 g IntraVENous Q8H    acetaminophen (TYLENOL) tablet 650 mg  650 mg Oral Q4H PRN    oxyCODONE-acetaminophen (PERCOCET 10)  mg per tablet 2 Tab  2 Tab Oral Q4H PRN    HYDROmorphone (PF) (DILAUDID) injection 2 mg  2 mg IntraVENous Q3H PRN    ondansetron (ZOFRAN) injection 4 mg  4 mg IntraVENous Q4H PRN    carvedilol (COREG) tablet 6.25 mg  6.25 mg Oral BID WITH MEALS    carBAMazepine (TEGretol) tablet 200 mg  200 mg Oral TID    lisinopril (PRINIVIL, ZESTRIL) tablet 40 mg  40 mg Oral DAILY    amphetamine-dextroamphetamine XR (ADDERALL XR) capsule 10 mg  10 mg Oral BID    therapeutic multivitamin (THERAGRAN) tablet 1 Tab  1 Tab Oral DAILY           Tanya Healy DO  Internal Medicine, Hospitalist  Pager: [phone] [facility]

## 2017-01-25 NOTE — PROGRESS NOTES
0800: Pt resting in bed. Complaining of pain. Pt states, \" I feel like I am trying to play catch up\". Pt states he does not think last dose of dilaudid went into IV because IV was leaking after administration. Pt received Percocet po around 7 am. Pt states he is still in pain. RN notified. Pt due for next dose of dilaudid at this time. Pt has not taken percocet overnight. Informed pt that RN can alternate use of percocet and dilaudid today. Pt verbalized understanding. Informed pt that he would be getting out of bed this am after pain is under control. Pt agreeable. States he is ready to get up. Educated pt on use of IS. Pt states, \"I used it once\". Informed pt that he needs to use IS 10 times per hour. Pt demonstrates understanding. Tolerates IS at 2500. Provided pt with cranberry juice. Emptied 300 cc clear, yellow urine from urinal. Pt denies further needs at this time. Call bell within reach. 0900: AM rounds complete. Pt in bed eating breakfast. Possible plans for d/c this afternoon based on pain control. LUDIN Lucio at bedside discussing plan of care. Pt denies further needs at this time. 0930: Pt states, \"I thought I could get dilaudid every 2 hours\". Informed pt that dilaudid is ordered q3h prn. Pt re-educated on pain medication times written on board. Verbalized understanding. Assisted pt to sit on edge of bed. Offered to assist pt to chair. Pt refused. States, \"I don't think I can walk right now. Maybe after my next pain med\". Offered to assist pt to stand at bedside. Pt refused at this time. 1215: Per charge nurse pt states that he was told by myself that he can have pain meds every 2 hours. Pt sitting up on side of bed. Re-educated pt that dilaudid is ordered q3h and percocet ordered q4h. Pt verbalized understanding. Informed pt that he need to ask for pain meds based on the times written on white board and that staff will be in to check on him hourly. Pt verbalized understanding.  Pt has also received this education from nursing staff.     -Orthopedic

## 2017-01-25 NOTE — DISCHARGE INSTRUCTIONS
DISCHARGE SUMMARY from Nurse    The following personal items are in your possession at time of discharge:    Dental Appliances: Uppers  Visual Aid: None                            PATIENT INSTRUCTIONS:    After general anesthesia or intravenous sedation, for 24 hours or while taking prescription Narcotics:  · Limit your activities  · Do not drive and operate hazardous machinery  · Do not make important personal or business decisions  · Do  not drink alcoholic beverages  · If you have not urinated within 8 hours after discharge, please contact your surgeon on call. Report the following to your surgeon:  · Excessive pain, swelling, redness or odor of or around the surgical area  · Temperature over 100.5  · Nausea and vomiting lasting longer than 4 hours or if unable to take medications  · Any signs of decreased circulation or nerve impairment to extremity: change in color, persistent  numbness, tingling, coldness or increase pain  · Any questions        *  Please give a list of your current medications to your Primary Care Provider. *  Please update this list whenever your medications are discontinued, doses are      changed, or new medications (including over-the-counter products) are added. *  Please carry medication information at all times in case of emergency situations. These are general instructions for a healthy lifestyle:    No smoking/ No tobacco products/ Avoid exposure to second hand smoke    Surgeon General's Warning:  Quitting smoking now greatly reduces serious risk to your health.     Obesity, smoking, and sedentary lifestyle greatly increases your risk for illness    A healthy diet, regular physical exercise & weight monitoring are important for maintaining a healthy lifestyle    You may be retaining fluid if you have a history of heart failure or if you experience any of the following symptoms:  Weight gain of 3 pounds or more overnight or 5 pounds in a week, increased swelling in our hands or feet or shortness of breath while lying flat in bed. Please call your doctor as soon as you notice any of these symptoms; do not wait until your next office visit. Recognize signs and symptoms of STROKE:    F-face looks uneven    A-arms unable to move or move unevenly    S-speech slurred or non-existent    T-time-call 911 as soon as signs and symptoms begin-DO NOT go       Back to bed or wait to see if you get better-TIME IS BRAIN. Warning Signs of HEART ATTACK     Call 911 if you have these symptoms:   Chest discomfort. Most heart attacks involve discomfort in the center of the chest that lasts more than a few minutes, or that goes away and comes back. It can feel like uncomfortable pressure, squeezing, fullness, or pain.  Discomfort in other areas of the upper body. Symptoms can include pain or discomfort in one or both arms, the back, neck, jaw, or stomach.  Shortness of breath with or without chest discomfort.  Other signs may include breaking out in a cold sweat, nausea, or lightheadedness. Don't wait more than five minutes to call 911 - MINUTES MATTER! Fast action can save your life. Calling 911 is almost always the fastest way to get lifesaving treatment. Emergency Medical Services staff can begin treatment when they arrive -- up to an hour sooner than if someone gets to the hospital by car. The discharge information has been reviewed with the patient. The patient verbalized understanding. Discharge medications reviewed with the patient and appropriate educational materials and side effects teaching were provided.     Patient armband removed and shredded

## 2017-01-25 NOTE — PROGRESS NOTES
Orders received and chart reviewed patient reports cannot do therapy at this time due to increased pain set appointment after next pain shot for around 1230 will see later today. eF Barry

## 2017-01-25 NOTE — DISCHARGE SUMMARY
Orthopaedics    Patient without complaints status post LAMINEC/FACETECT/FORAMIN,LUMBAR [49315] (SPINE LUMBAR LAMINECTOMY WITH INSTRUMENTATION)  LAMINEC/FACETECT/FORAMIN,LUMBAR [[id number]] for stenosis m48.06  stenosis m48.06 1/24/2017. Voiding, ambulating, tolerating diet. No complications during hospital stay and cleared for discharge . Past Medical History   Diagnosis Date    Arrhythmia     Arthritis     Asthma     Hypertension     Insomnia     Seizures ([address] 2002    Traumatic brain injury, closed ([address] 1999       Visit Vitals    /75 (BP 1 Location: Left arm, BP Patient Position: At rest)    Pulse 68    Temp 98.3 °F (36.8 °C)    Resp 16    Ht 5' 10\" (1.778 m)    Wt 102.5 kg (226 lb)    SpO2 100%    BMI 32.43 kg/m2       CBC w/Diff    No results found for: WBC, RBC, HCT, MCV, MCH, MCHC, RDW, HCTEXT No results found for: BANDS, LYMPHOCYTES, MONOS, EOS, BASOS, BLAST, RDW     Current Discharge Medication List      CONTINUE these medications which have NOT CHANGED    Details   dextroamphetamine-amphetamine (ADDERALL) 10 mg tablet Take 10 mg by mouth two (2) times a day. ibuprofen 100 mg tablet Take 800 mg by mouth daily. HYDROcodone-acetaminophen (NORCO)  mg tablet Take 1 Tab by mouth. albuterol (VENTOLIN HFA) 90 mcg/actuation inhaler Take 2 Puffs by inhalation every four (4) hours as needed for Wheezing. CARBAMAZEPINE (CARBATROL PO) Take 200 mg by mouth three (3) times daily. CARVEDILOL PO Take 6.25 mg by mouth two (2) times a day. LISINOPRIL PO Take 40 mg by mouth daily. APIXABAN (ELIQUIS PO) Take 5 mg by mouth two (2) times a day. DIAZEPAM PO Take 10 mg by mouth daily. multivitamin (ONE A DAY) tablet Take 1 Tab by mouth daily. traMADol (ULTRAM) 50 mg tablet Take 50 mg by mouth daily. Physical exam: aaox3, surgical dressing dry. NVI Bilateral Lower Extremities. BLE compartments soft and nontender.        Assessment: Status post LAMINEC/FACETECT/FORAMIN,LUMBAR [[id number]] (SPINE LUMBAR LAMINECTOMY WITH INSTRUMENTATION)  LAMINEC/FACETECT/FORAMIN,LUMBAR [[id number]] for stenosis m48.06  stenosis m48.06,  doing well. PLAN:  Discharge to home. Followup in the office in 2 weeks. Will take  perccoet prn pain. Ok to shower 5days post-op.     LUDIN Lara  January 25, 2017

## 2017-01-25 NOTE — OP NOTES
Audie Rebolledo    Name:  Julia Bolanos  MR#:  [id number]  :  1960  Account #:  [de-identified]  Date of Adm:  2017  Date of Surgery:  2017      ATTENDING: Whit Morrison III, MD    PREOPERATIVE DIAGNOSIS: Lumbar stenosis, L4-S1. POSTOPERATIVE DIAGNOSIS: Lumbar stenosis, L4-S1. PROCEDURES PERFORMED:  1. Lumbar decompression, L4-S1, with bilateral exploration,  decompression of the L4, L5 and S1 nerve roots with foraminotomy  and partial facetectomy. 2. Visualization of the left L4-5 disk space. SURGEON: Whit Morrison III, MD    ASSISTANT: Fabricio Lee. ANESTHESIA: General.    ESTIMATED BLOOD LOSS: 100 mL. No blood products. FLUIDS: Crystalloid 1800 mL. SPECIMENS REMOVED: None. TUBES AND DRAINS: No tubes or drains. COUNTS: Needle and sponge count correct. COMPLICATIONS: No complication. CONDITION: After the case, anterior tibial, gastrocnemius soleus  intact bilaterally. FINDINGS: The patient had a central subarticular stenosis, primarily  L4-5. There was left L5-S1 foraminal stenosis. The patient had  generalized central disk bulge, slightly centric to the left L4-5; however,  there was no extruded or sequestered fragment and after removal of  bony elements, it was not felt necessary to excise of the disk  herniation. DESCRIPTION OF PROCEDURE: Under general anesthesia, the  patient rolled in prone position on Tyler frame, peripheral nerves  padded, back prepped and draped in a sterile fashion. Midline incision  made from the caudal border of L3 to the sacrum. Muscle  subperiosteally exposed lateral facet joints, taking care to preserve  facet capsules. Radiograph taken for localization of position. The inferior portion of the L3 lamina, L4 lamina, L5 lamina, superior  portion of S1 lamina removed with Leksell and Kerrison rongeurs.   Decompression widened to the L4, L5 and S1 pedicles, and respective  L4, L5 and S1 nerve roots decompressed bilaterally around the  pedicles into the foramen, performed foraminotomy and partial  facetectomy. At the end of decompression, ball probe could be placed  around the pedicles into the foramen without nerve root compromise. The left L5 nerve root and dural tube was mobilized medially,  especially generalized bulging of the left L4-5 disk, it was not felt that  excision was indicated. Wound copiously irrigated. Gelfoam placed over exposed dura, and  then the wound was closed in layers with powdered vancomycin in  deep subcutaneous tissue, 0.5% plain Marcaine in the fascia and  subcutaneous tissue. Wound dressed. The patient awakened and  taken to recovery room in satisfactory condition without complication. MD Brenda Linares Ornohemi / ZO  D:  01/24/2017   15:37  T:  01/24/2017   23:12  Job #:  [id number]    Giuliano Vargas NP, [facility]. [address]  [address] [place] [address], [address] [place] [address]

## 2017-01-25 NOTE — PROGRESS NOTES
Patient c/o poor pain control over night, apparently was not asking for oral pain meds. Otherwise doing well, LLE pain improved post-op, voiding. Has not been up /oob. Visit Vitals    /75 (BP 1 Location: Left arm, BP Patient Position: At rest)    Pulse 68    Temp 98.3 °F (36.8 °C)    Resp 16    Ht 5' 10\" (1.778 m)    Wt 102.5 kg (226 lb)    SpO2 100%    BMI 32.43 kg/m2       CBC w/Diff    No results found for: WBC, RBC, HCT, MCV, MCH, MCHC, RDW, HCTEXT No results found for: BANDS, LYMPHOCYTES, MONOS, EOS, BASOS, BLAST, RDW       Physical exam: aaox3, surgical dressing dry,  bilateral anterior tibialis and gastrocnemius strength 5/5 , palpable distal pulses, sensation intact,  BLE compartments soft  and nontender. Assessment:  Status post LAMINEC/FACETECT/FORAMIN,LUMBAR [75027] (SPINE LUMBAR LAMINECTOMY WITH INSTRUMENTATION)  LAMINEC/FACETECT/FORAMIN,LUMBAR [[id number]] for stenosis m48.06  stenosis m48.06 ,  progressing.     PLAN:  Mobilize with P.T.   DVT ppx-scds   Discharge Planning-home this afternoon if pain controlled and ambulating    LUDIN Andrew  January 25, 2017

## 2017-01-25 NOTE — PROGRESS NOTES
Patient and/or next of kin has been given the Outpatient Observation Information and Notification letter and all questions answered.   Pat [address] [facility] [address]. [address]92

## 2017-01-25 NOTE — PROGRESS NOTES
conducted an initial consultation and Spiritual Assessment for Harini Hampton, who is a 55 y. o.,male. Patients Primary Language is: [place]. According to the patients EMR Jewish Affiliation is: [place]. The reason the Patient came to the hospital is: There are no active problems to display for this patient. The  provided the following Interventions:  Initiated a relationship of care and support at 1024 this morning with patient in room 2222. Listened empathically as patient talked about being here in the hospital and his hopes for a speedy recovery and discharge. Provided information about Spiritual Care Services and of our continued presence here for him. Offered prayer and assurance of continued prayers on patients behalf. The following outcomes were achieved:  Patient shared limited information about his medical narrative and spiritual journey/beliefs. Patient processed feeling about current hospitalization. Patient expressed gratitude for pastoral care visit. Assessment:  Patient does not have any Voodoo/cultural needs that will affect patients preferences in health care. There are no further spiritual or Voodoo issues which require Spiritual Care Services interventions at this time. Plan:  Chaplains will continue to follow and will provide pastoral care on an as needed/requested basis    . Monika Sumner   Spiritual Care   [phone]

## 2017-01-25 NOTE — PROGRESS NOTES
Patient ambulating without assistive devices and appears comfortable during my exam. He even followed me into the hallway after i left his room to ask additional questions and show me photos of his dog. He is ambulatory, tolerating diet, voiding and ready for discharge. After I left nurse reports he refuses to leave and continues to demand dilaudid. Again returned to room with charge nurse tl explained to patient and made it very clear that he is discharged and will need to call for a ride home. He states he will call his wife. Dilaudid d/c'd in anticipation of discharge home.      Rick Boland PA-C  [facility]

## 2017-01-25 NOTE — CONSULTS
Internal Medicine Consult          Consult Requested By: Dr. Job Queen, specialty Orthopedics    Subjective     HPI: Elfego Whaley is a 55 y.o. male with a PMHx of HTN, Paroxysysmal Atrial fibrillation on eliquis, adult ADHD, seizure disoder and insomnia who we were consulted for medical management while undergoing recent lumbar decompression. The patient tolerated the procedure well and only admits to pain at this time controlled with medication. He has been off his eliquis since Sunday. He is not currently in atrial fibrillation at this time. He offers no other complaints at this time    Code Status:  Full    PMHx:  HTN  Paroxysysmal Atrial Fibrillation  Adult ADHD  Seizure Disorder  Insomnia    PSurgHx:  Cervical neck fusion  Pins B/L ankles  Umbilical hernia  Back x 2    SocialHx:  Tobacco: Denies  EtOH: Denies  Drugs: Denies  Occupation: Disabled    FamilyHx:  F - Leukemia    Prior to Admission Medications   Prescriptions Last Dose Informant Patient Reported? Taking? APIXABAN (ELIQUIS PO) 1/22/2017  Yes Yes   Sig: Take 5 mg by mouth two (2) times a day. CARBAMAZEPINE (CARBATROL PO) 1/24/2017 at Unknown time  Yes Yes   Sig: Take 200 mg by mouth three (3) times daily. CARVEDILOL PO 1/24/2017 at Unknown time  Yes Yes   Sig: Take 6.25 mg by mouth two (2) times a day. DIAZEPAM PO 1/23/2017 at Unknown time  Yes Yes   Sig: Take 10 mg by mouth daily. HYDROcodone-acetaminophen (NORCO)  mg tablet 1/23/2017 at Unknown time  Yes Yes   Sig: Take 1 Tab by mouth. LISINOPRIL PO 1/24/2017 at Unknown time  Yes Yes   Sig: Take 40 mg by mouth daily. albuterol (VENTOLIN HFA) 90 mcg/actuation inhaler 11/24/2016  Yes Yes   Sig: Take 2 Puffs by inhalation every four (4) hours as needed for Wheezing. dextroamphetamine-amphetamine (ADDERALL) 10 mg tablet 1/23/2017 at Unknown time  Yes Yes   Sig: Take 10 mg by mouth two (2) times a day.            ibuprofen 100 mg tablet 1/17/2017 at Unknown time  Yes Yes   Sig: Take 800 mg by mouth daily. multivitamin (ONE A DAY) tablet 1/23/2017 at Unknown time  Yes Yes   Sig: Take 1 Tab by mouth daily. traMADol (ULTRAM) 50 mg tablet Unknown at Unknown time  Yes No   Sig: Take 50 mg by mouth daily. Facility-Administered Medications: None       Review of Systems:  Constitutional:  Denies fever, chills or weight loss, admits pain  HEENT:  Denies headache or visual changes  Cardiovascular:  Denies chest pain or diaphoresis  Respiratory:  Denies coughing, wheezing, or shortness of breath. GI:  Denies nausea or vomitting. Denies diarrhea or constipation  :  Denies hematuria or dysuria  Skin:  Denies rashes or moles  Neuro:  Denies seizures, loss of sensation or motor function or syncope  Hematological:  Denies bruising or bleeding      Objective      Visit Vitals    /66 (BP 1 Location: Left arm, BP Patient Position: At rest)    Pulse 60    Temp 97.4 °F (36.3 °C)    Resp 11    Ht 5' 10\" (1.778 m)    Wt 102.5 kg (226 lb)    SpO2 92%    BMI 32.43 kg/m2       Physical Exam:  Gen: Pt resting comfortably, NAD  HEENT: NC/AT, EOMI  Neck: Supple, no JVD  Lungs: CTA, no w/r/r  Heart: RRR, no m/r/g  Abd: Soft, NT/ND, + BS  Ext: No cyanosis, no edema  Skin: Intact, tattoos  Neuro: A&O, no focal deficts  Psych: Normal mood/affect    Imaging Reviewed:  No results found. Assessment/Plan     #Spinal Stenosis s/p DECOMPRESSION L4-5, 5-S1  #Paroxysysmal Atrial Fibrillation on Eliquis  #Seizure Disorder  #HTN  #Adult ADHD  #Insomnia  #DVT PPx    - Pain control PRN. F/u ortho. PT/OT eval  - In NSR. Hold eliquis perioperatively. Cont BB  - Cont home anti-epleptics  - BP stable.  Cont home regimen  - Restart home adderall ziyad to prevent withdrawal effects  - Cont valium qhs PRN  - SCDs    We appreciate being involved in the care of your patient during their admission      Marylu Martinez, DO  Internal Medicine, Hospitalist  Pager: [facility]

## 2017-01-25 NOTE — PROGRESS NOTES
[facility]   Discharge Planning/ Assessment    Reasons for Intervention: Chart reviewed. Met with pt., verified all demographics. States has [place] ins. \[facility] [place] [facility]\"" Dr. Zayra Guan is his PCP. NOK: Chance Correa, spouse, with whom he lives with & designates can participate in his discharge process. Has cane. Independent with ADL's prior to admit. PLAN: home. Available as needed. [address] [facility] [address].       High Risk Criteria  [] Yes  [x]No   Physician Referral  [] Yes  [x]No        Date    Nursing Referral  [] Yes  [x]No        Date    Patient/Family Request  [] Yes  [x]No        Date       Resources:    Medicare  [x] Yes  []No   Medicaid  [x] Yes  []No   No Resources  [] Yes  [x]No   Private Insurance  [] Yes  [x]No    Name/Phone Number    Other  [] Yes  [x]No        (i.e. Workman's Comp)         Prior Services:    Prior Services  [] Yes  [x]No   Home Health  [] Yes  [x]No   [address]  [] Yes  [x]No        Number of [address] Program  [] Yes  [x]No       Meals on Wheels  [] Yes  [x]No   Office on Aging  [] Yes  [x]No   Transportation Services  [] Yes  [x]No   Nursing Home  [] Yes  [x]No        Nursing Home Name    [address]  [] Yes  [x]No        [address] Name    Other       Information Source:      Information obtained from  [x] Patient  [] Parent   [] [address]   [] Child  [] Spouse   [] Significant Other/Partner   [] Friend      [] EMS    [] Nursing Home Chart          [] Other:   Chart Review  [x] Yes  []No     Family/Support System:    Patient lives with  [] Alone    [x] Spouse   [] Significant Other  [] Children  [] Caretaker   [] Parent  [] Sibling     [] Other       Other Support System:    Is the patient responsible for care of others  [] Yes  [x]No   Information of person caring for patient on  discharge    Managers financial affairs independently  [x] Yes  []No   If no, explain:      Status Prior to Admission:    Mental Status  [x] Awake  [x] Alert  [x] Oriented  [x] Quiet/Calm [] Lethargic/Sedated   [] Disoriented  [] Restless/Anxious  [] Combative   Personal Care  [] Dependent  [x] Independent Personal Care  [] Requires Assistance   Meal Preparation Ability  [x] Independent   [] Standby Assistance   [] Minimal Assistance   [] Moderate Assistance  [] Maximum Assistance     [] Total Assistance   Chores  [x] Independent with Chores   [] N/A Nursing Home Resident   [] Requires Assistance   Bowel/Bladder  [x] Continent  [] Catheter  [] Incontinent  [] Ostomy Self-Care    [] Urine Diversion Self-Care  [] Maximum Assistance     [] Total Assistance   Number of Persons needed for assistance    DME at home  [] [address] [facility] [address]  [x] [address] [facility] [address]   [] Commode    [] Bathroom/Grab Bars  [] Hospital Bed  [] Nebulizer  [] Oxygen           [] Raised Toilet Seat  [] Shower Chair  [] Side Rails for Bed   [] Tub Transfer Bench   [] Branodn Anders  [] Leah Martinez Standard      [] Other:   Vendor      Treatment Presently Receiving:    Current Treatments  [] Chemotherapy  [] Dialysis  [] Insulin  [] IVAB [x] IVF   [] O2  [] PCA   [x] PT   [] RT   [] Tube Feedings   [] Wound Care     Psychosocial Evaluation:    Verbalized Knowledge of Disease Process  [] Patient  []Family   Coping with Disease Process  [] Patient  []Family   Requires Further Counseling Coping with Disease Process  [] Patient  []Family     Identified Projected Needs:    Home Health Aid  [] Yes  [x]No   Transportation  [] Yes  [x]No   Education  [] Yes  [x]No        Specific Education     Financial Counseling  [] Yes  [x]No   Inability to Care for Self/Will Require 24 hour care  [] Yes  [x]No   Pain Management  [] Yes  [x]No   Home Infusion Therapy  [] Yes  [x]No   Oxygen Therapy  [] Yes  [x]No   DME  [] Yes  [x]No   Long Term Care Placement  [] Yes  [x]No   Rehab  [] Yes  [x]No   Physical Therapy  [] Yes  [x]No   Needs Anticipated At This Time  [] Yes  [x]No Intra-Hospital Referral:    [address] [facility]  [] Yes  [x]No     [] Yes  [x]No   Patient Representative  [] Yes  [x]No   Staff for Teaching Needs  [] Yes  [x]No   Specialty Teaching Needs     Diabetic Educator  [] Yes  [x]No   Referral for Diabetic Educator Needed  [] Yes  [x]No  If Yes, place order for Nutritionist or Diabetic Consult     Tentative Discharge Plan:    Home with No Services  [x] Yes  []No   Home with [address]  [] Yes  [x]No        If Yes, specify type    Home Care Program  [] Yes  [x]No        If Yes, specify type    Meals on Wheels  [] Yes  [x]No   Office of Aging  [] Yes  [x]No   NHP  [] Yes  [x]No   Return to the Nursing Home  [] Yes  [x]No   Rehab Therapy  [] Yes  [x]No   Acute Rehab  [] Yes  [x]No   Subacute Rehab  [] Yes  [x]No   Private Care  [] Yes  [x]No   Substance Abuse Referral  [] Yes  [x]No   Transportation  [] Yes  [x]No   Chore Service  [] Yes  [x]No   Inpatient Hospice  [] Yes  [x]No   OP RT  [] Yes  [x] No   OP Hemo  [] Yes  [x] No   OP PT  [] Yes  [x]No   Support Group  [] Yes  [x]No   Reach to Recovery  [] Yes  [x]No   OP Oncology Clinic  [] Yes  [x]No   Clinic Appointment  [] Yes  [x]No   DME  [] Yes  [x]No   Comments    Name of D/C Planner or  Given to Patient or Family Catarino Zhangvivienne   Phone Number Pager: [phone]        Extension Ext. [phone]. [phone]   Date 1-   Time    If you are discharged home, whom do you designate to participate in your discharge plan and receive any information needed?      Enter name of Maria Jolley        Phone # of designee [phone] / [phone]            Address of Aranza Ferr[place], [address] [address]        Updated         Patient refused to designate any           individual

## 2017-01-25 NOTE — PROGRESS NOTES
1940 Report received; Assessment done. 2044 Pt asked for pain medication; given dilaudid for pain; in bed; resting; call bel within reach ; bed on the lowest position. 2241 Given valium; pt requested to take it around this time instead of 2000; tolerated; in bed; resting; call bell within reach; bed on the lowest position. 0100 Schedule medication given; Pt urinated on the urinal; dilaudid given for pain; safety measures maintained.    0302 Pt in bed; asleep; call bell within reach; bed on the lowest position. 0500 Pt laying in bed; complaining of 10/10 pain; given dilaudid; repositioned for comfort. Safety measures maintained. 2766 Pt sleeping in bed; call bell within reach; bed on the lowest position. Bedside shift change report given to Abdirashid Han Rn (oncoming nurse) by Kylah Smith (offgoing nurse). Report included the following information SBAR, Kardex and MAR.

## 2017-01-26 NOTE — PROGRESS NOTES
Problem: Mobility Impaired (Adult and Pediatric)  Goal: *Acute Goals and Plan of Care (Insert Text)  Physical Therapy Goals  Initiated 1/25/2017 and to be accomplished within 7 day(s) FOLLOWING BACK PRECAUTIONS  1. Patient will move from supine to sit and sit to supine , scoot up and down and roll side to side in bed with modified independence. 2. Patient will transfer from bed to chair and chair to bed with modified independence using the least restrictive device. 3. Patient will perform sit to stand with modified independence. 4. Patient will ambulate with modified independence for 150 feet with the least restrictive device. 5. Patient will ascend/descend 5 stairs with handrail(s) with modified independence. Outcome: Progressing Towards Goal  PHYSICAL THERAPY EVALUATION       [facility]  LUMBAR PRECAUTIONS     DO NOT TWIST your back. DO NOT BEND to  objects. Use the Squat Lift method or use a *REACHER STICK. Get out of bed using the Log Roll method. DO NOT LIFT more than 5 pounds until cleared by your physician. DO NOT RIDE in a car except to go home from the hospital or to see your physician. DO NOT DRIVE until cleared by your physician. Limit sitting to less than one hour at a time. Use a long handled back brush/sponge to wash your feet if you cannot reach them. Squat or sit on a chair when removing items from the refrigerator. Put all frequently used kitchen items within easy reach. Sit to put on pants, socks, and shoes. Do not perform lower body dressing while standing up. Carry items close to your body. If needed, sit on a shower chair and use an extended hand-held shower for bathing. Do not shower until cleared by physician. Conserve energy by pacing  yourself during your daily activities.         *Reachers are available at local drug stores for about $10 - $15 or can be ordered from a catalog provided by the therapy department. In drug stores they are often sold  under the name of Tenfoot.         Patient: Rama Sol [de-identified]55 y.o. male)  Date: 1/25/2017  Primary Diagnosis: stenosis m48.06  stenosis m48.06  Procedure(s) (LRB):  DECOMPRESSION L4-5, 5-S1 (Bilateral) 1 Day Post-Op   Precautions:   Back, Fall      ASSESSMENT :  Based on the objective data described below, the patient presents with Decreased ADL/Functional Activities  Decreased Transfer Abilities  Decreased Ambulation Ability/Technique  Decreased Balance  Increased Pain  Decreased Knowledge of Precautions secondary to lumbar pain, pain down legs and precautions. Patient requires between supervision/set-up and minimal assistance/contact guard assist for bed mobility, transfers and ambulation. No brace indicated for this procedure patient reports pian is 10/10 but agreed to walk with rolling walker. And keeping knees flexed patient reported he had just been told he was discharged and he needs one more day told will let  Nursing and PA know. Patient demonstrates a good understanding of lumbar precautions. Left supine in bed  Patient will benefit from skilled intervention to address the above impairments.   Patients rehabilitation potential is considered to be Fair  Factors which may influence rehabilitation potential include:   [ ]         None noted  [ ]         Mental ability/status  [X]         Medical condition  [ ]         Home/family situation and support systems  [ ]         Safety awareness  [X]         Pain tolerance/management  [ ]         Other:        PLAN :  Recommendations and Planned Interventions:  [X]           Bed Mobility Training             [X]    Neuromuscular Re-Education  [X]           Transfer Training                   [X]    Orthotic/Prosthetic Training  [X]           Gait Training                          [ ]    Modalities  [ ]           Therapeutic Exercises          [ ]    Edema Management/Control  [X] Therapeutic Activities            [X]    Patient and Family Training/Education  [ ]           Other (comment):     Frequency/Duration: Patient will be followed by physical therapy 1-2 times per day/4-7 days per week to address goals. Discharge Recommendations: None  Further Equipment Recommendations for Discharge: rolling walker ? SUBJECTIVE:   Patient stated .      OBJECTIVE DATA SUMMARY:       Past Medical History   Diagnosis Date    Arrhythmia      Arthritis      Asthma      Hypertension      Insomnia      Seizures ([facility] [address] 2002    Traumatic brain injury, closed ([facility] [address] 1999     Past Surgical History   Procedure Laterality Date    Pr neurological procedure unlisted           BACK & NECK SX    Hx hernia repair        Hx orthopaedic           FOOT & KNEE SX     Barriers to Learning/Limitations: yes;  physical  Compensate with: visual, verbal, tactile, kinesthetic cues/model  GCODES(GP):Mobility  Current  CJ= 20-39%   Goal  CI= 1-19%  D/C  CJ= 20-39%. The severity rating is based on the Other [employer] Balance Scale3+/5   [employer] Balance Scale3+/5  0: Pt performs 25% or less of standing activity (Max assist) CN, 100% impaired. 1: Pt supports self with upper extremities but requires therapist assistance. Pt performs 25-50% of effort (Mod assist) CM, 80% to <100% impaired. 1+: Pt supports self with upper extremities but requires therapist assistance. Pt performs >50% effort. (Min assist). CL, 60% to <80% impaired. 2: Pt supports self independently with both upper extremities (walker, crutches, parallel bars). CL, 60% to <80% impaired. 2+: Pt support self independently with 1 upper extremity (cane, crutch, 1 parallel bar). CK, 40% to <60% impaired. 3: Pt stands without upper extremity support for up to 30 seconds. CK, 40% to <60% impaired. 3+: Pt stands without upper extremity support for 30 seconds or greater. CJ, 20% to <40% impaired.   4: Pt independently moves and returns center of gravity 1-2 inches in one plane. CJ, 20% to <40% impaired. 4+: Pt independently moves and returns center of gravity 1-2 inches in multiple planes. CI, 1% to <20% impaired. 5: Pt independently moves and returns center of gravity in all planes greater than 2 inches. CH, 0% impaired. Eval Complexity: History: MEDIUM  Complexity : 1-2 comorbidities / personal factors will impact the outcome/ POC Exam:MEDIUM Complexity : 3 Standardized tests and measures addressing body structure, function, activity limitation and / or participation in recreation  Presentation: MEDIUM Complexity : Evolving with changing characteristics  Clinical Decision Making:Medium Complexity [facility] Standing Balance Scale3+/5 Overall Complexity:MEDIUM  Prior Level of Function/Home Situation: I with  adl's and  ambulation with cane   Home Situation  Home Environment: Private residence  One/Two Story Residence: Two story  Living Alone: No  Support Systems: Family member(s), Spouse/Significant Other/Partner  Patient Expects to be Discharged to[de-identified] Private residence  Current DME Used/Available at Home: jose alberto Thornton  Critical Behavior:  Neurologic State: Alert  Orientation Level: Oriented X4  Cognition: Follows commands; Appropriate decision making  Safety/Judgement: Fall prevention; Awareness of environment  Psychosocial  Patient Behaviors: Calm; Cooperative; Anxious  Purposeful Interaction: Yes  Pt Identified Daily Priority: Clinical issues (comment)  Caritas Process: Nurture loving kindness;Enable claudia/hope;Establish trust;Nurture spiritual self;Teaching/learning; Attend basic human needs;Create healing environment;Supportive expression  Caring Interventions: Reassure; Therapeutic modalities  Reassure: Therapeutic listening; Informing; Acceptance; Instilling claudia and hope;Support family;Quiet presence;Caring rounds  Skin Integrity: Incision (comment)  Strength:    Strength: Generally decreased, functional (grossly 3+/5)  Tone & Sensation:   Tone: Normal  Sensation: Impaired both legs in posterior   Range Of Motion:  AROM: Generally decreased, functional  PROM: Within functional limits  Functional Mobility:  Bed Mobility:  Rolling: Contact guard assistance;Stand-by asssistance; Additional time  Supine to Sit:  (SITTING AT EDGE OF BED.)  Sit to Supine: Contact guard assistance; Additional time  Transfers:  Sit to Stand: Contact guard assistance;Stand-by asssistance; Additional time  Stand to Sit: Stand-by asssistance;Contact guard assistance; Additional time  Balance:   Sitting: Impaired  Sitting - Static: Good (unsupported)  Sitting - Dynamic: Fair (occasional)  Standing: Impaired  Standing - Static: Fair  Standing - Dynamic : Fair  Ambulation/Gait Training:  Distance (ft): 35 Feet (ft) (X2)  Ambulation - Level of Assistance: Supervision; Additional time  Gait Description (WDL): Exceptions to WDL  Gait Abnormalities: Antalgic;Decreased step clearance;Shuffling gait; Path deviations  Base of Support: Center of gravity altered  Speed/Su: Slow;Shuffled  Step Length: Right shortened;Left shortened  Interventions: Safety awareness training;Verbal cues; Visual/Demos        Pain: 10/10 pre and post   Pain Scale 1: Numeric (0 - 10)  Activity Tolerance:   Fair minus   Please refer to the flowsheet for vital signs taken during this treatment. After treatment:   [ ]         Patient left in no apparent distress sitting up in chair  [X]         Patient left in no apparent distress in bed  [X]         Call bell left within reach  [X]         Nursing notified  [ ]         Caregiver present  [ ]         Bed alarm activated      COMMUNICATION/EDUCATION:   [X]         Fall prevention education was provided and the patient/caregiver indicated understanding. [X]         Patient/family have participated as able in goal setting and plan of care. [X]         Patient/family agree to work toward stated goals and plan of care.   [ ] Patient understands intent and goals of therapy, but is neutral about his/her participation. [ ]         Patient is unable to participate in goal setting and plan of care. Patient educated on role of physical therapy and lumbar precautions needs reinforcement and verbalized understanding.  .       Thank you for this referral.  Alejandra Giron, PT   Time Calculation: 20 mins

## 2017-01-27 NOTE — ANCILLARY DISCHARGE INSTRUCTIONS
[facility]  Discharge Phone Call       After-Care Discharge Phone Call Questions:    Were you able to get your prescriptions filled? Comment:      [x] Yes  []No    Comment if answer is \"No\"   Are you taking your medication(s) as your doctor ordered? Do you understand the purpose of your medications? Comment:    [] Yes  []No    Comment if answer is \"No\"   Are you taking any other medications that are not on the list?  Comment:      [x] Yes  []No    Comment if answer is \"Yes\"   Do you have any questions about your medications? Comment:    [] Yes  [x]No    Comment if answer is \"Yes\"   Did you make your follow-up appointments (if the hospital did not do this before  discharge)? Comment:    [x] Yes  []No    Comment if answer is \"No\"   Is there any reason you might not be able to keep your follow-up appointments? Comment:     [] Yes  [x]No    Comment if answer is \"Yes\"   Do you have any questions about your care plan? Comment:    [] Yes  [x]No    Comment if answer is \"Yes\"   Do you have a good understanding of how you should manage your health? Comment:    [x] Yes  []No    Comment if answer is \"Yes\"   Do you know which symptoms to watch for that would mean you would need to call your doctor right away? Comment:      [x] Yes  []No    Comment if answer is \"No\"   Do you have any questions about the follow up process or any instructions that we have provided? Comment:    [] Yes  [x]No    Comment if answer is \"Yes\"   Did staff take your preferences into account?

## 2017-06-27 PROBLEM — N28.89 RENAL MASS: Status: ACTIVE | Noted: 2017-06-27

## 2017-06-28 ENCOUNTER — HOSPITAL ENCOUNTER (OUTPATIENT)
Dept: MRI IMAGING | Age: 57
Discharge: HOME | End: 2017-06-28
Attending: ORTHOPAEDIC SURGERY
Payer: MEDICARE

## 2017-06-28 DIAGNOSIS — M54.50 LUMBAGO: ICD-10-CM

## 2017-06-28 DIAGNOSIS — M48.061 SPINAL STENOSIS, LUMBAR REGION, WITHOUT NEUROGENIC CLAUDICATION: ICD-10-CM

## 2017-06-28 LAB — CREAT UR-MCNC: 0.9 MG/DL (ref 0.6–1.3)

## 2017-06-28 PROCEDURE — A9585 GADOBUTROL INJECTION: HCPCS | Performed by: ORTHOPAEDIC SURGERY

## 2017-06-28 PROCEDURE — 82565 ASSAY OF CREATININE: CPT

## 2017-06-28 PROCEDURE — 72158 MRI LUMBAR SPINE W/O & W/DYE: CPT

## 2017-06-28 PROCEDURE — 74011250636 HC RX REV CODE- 250/636: Performed by: ORTHOPAEDIC SURGERY

## 2017-06-28 RX ADMIN — GADOBUTROL 7.5 ML: 604.72 INJECTION INTRAVENOUS at 10:18

## 2017-07-07 PROBLEM — M51.35 DDD (DEGENERATIVE DISC DISEASE), THORACOLUMBAR: Status: ACTIVE | Noted: 2017-03-06

## 2017-07-07 PROBLEM — I10 ESSENTIAL HYPERTENSION: Status: ACTIVE | Noted: 2017-04-06

## 2017-07-07 PROBLEM — M54.9 CHRONIC LEFT-SIDED BACK PAIN: Status: ACTIVE | Noted: 2017-03-06

## 2017-07-07 PROBLEM — G89.29 CHRONIC LEFT-SIDED BACK PAIN: Status: ACTIVE | Noted: 2017-03-06

## 2017-07-07 PROBLEM — R56.9 SEIZURES (HCC): Status: ACTIVE | Noted: 2017-03-06

## 2017-07-07 PROBLEM — F51.01 PRIMARY INSOMNIA: Status: ACTIVE | Noted: 2017-03-06

## 2017-07-07 PROBLEM — Z87.820 H/O TRAUMATIC BRAIN INJURY: Status: ACTIVE | Noted: 2017-03-06

## 2017-07-07 PROBLEM — I48.0 PAROXYSMAL ATRIAL FIBRILLATION (HCC): Status: ACTIVE | Noted: 2017-03-06

## 2017-07-07 PROBLEM — Z79.899 HIGH RISK MEDICATIONS (NOT ANTICOAGULANTS) LONG-TERM USE: Status: ACTIVE | Noted: 2017-03-06

## 2017-07-07 PROBLEM — F98.8 ADD (ATTENTION DEFICIT DISORDER): Status: ACTIVE | Noted: 2017-03-06

## 2017-08-09 ENCOUNTER — ANESTHESIA EVENT (OUTPATIENT)
Dept: SURGERY | Age: 57
DRG: 661 | End: 2017-08-09
Payer: MEDICARE

## 2017-08-10 ENCOUNTER — HOSPITAL ENCOUNTER (INPATIENT)
Age: 57
LOS: 3 days | Discharge: HOME | DRG: 661 | End: 2017-08-13
Attending: UROLOGY | Admitting: UROLOGY
Payer: MEDICARE

## 2017-08-10 ENCOUNTER — ANESTHESIA (OUTPATIENT)
Dept: SURGERY | Age: 57
DRG: 661 | End: 2017-08-10
Payer: MEDICARE

## 2017-08-10 PROBLEM — N28.89 RENAL MASS: Status: ACTIVE | Noted: 2017-08-10

## 2017-08-10 LAB
ABO + RH BLD: NORMAL
BLOOD GROUP ANTIBODIES SERPL: NORMAL
SPECIMEN EXP DATE BLD: NORMAL

## 2017-08-10 PROCEDURE — 77030002966 HC SUT PDS J&J -A: Performed by: UROLOGY

## 2017-08-10 PROCEDURE — 77030003580 HC NDL INSUF VERES J&J -B: Performed by: UROLOGY

## 2017-08-10 PROCEDURE — 77030008603 HC TRCR ENDOSC EPATH J&J -C: Performed by: UROLOGY

## 2017-08-10 PROCEDURE — 74011250636 HC RX REV CODE- 250/636: Performed by: NURSE ANESTHETIST, CERTIFIED REGISTERED

## 2017-08-10 PROCEDURE — 77030019938 HC TBNG IV PCA ICUM -A

## 2017-08-10 PROCEDURE — 76010000132 HC OR TIME 2.5 TO 3 HR: Performed by: UROLOGY

## 2017-08-10 PROCEDURE — 74011250637 HC RX REV CODE- 250/637: Performed by: UROLOGY

## 2017-08-10 PROCEDURE — 77030009527 HC GEL PRT SYS AMR -E: Performed by: UROLOGY

## 2017-08-10 PROCEDURE — 76060000036 HC ANESTHESIA 2.5 TO 3 HR: Performed by: UROLOGY

## 2017-08-10 PROCEDURE — 74011000258 HC RX REV CODE- 258

## 2017-08-10 PROCEDURE — 77030011294 HC FCPS BPLR MCR J&J -B: Performed by: UROLOGY

## 2017-08-10 PROCEDURE — 65270000029 HC RM PRIVATE

## 2017-08-10 PROCEDURE — 0TB60ZZ EXCISION OF RIGHT URETER, OPEN APPROACH: ICD-10-PCS | Performed by: UROLOGY

## 2017-08-10 PROCEDURE — 77030035048 HC TRCR ENDOSC OPTCL COVD -B: Performed by: UROLOGY

## 2017-08-10 PROCEDURE — 77030018842 HC SOL IRR SOD CL 9% BAXT -A: Performed by: UROLOGY

## 2017-08-10 PROCEDURE — 77030037366 HC STPLR ENDO TRI-STPLR COVD -C: Performed by: UROLOGY

## 2017-08-10 PROCEDURE — 77030008477 HC STYL SATN SLP COVD -A: Performed by: NURSE ANESTHETIST, CERTIFIED REGISTERED

## 2017-08-10 PROCEDURE — 77030035051: Performed by: UROLOGY

## 2017-08-10 PROCEDURE — 77030013079 HC BLNKT BAIR HGGR 3M -A: Performed by: NURSE ANESTHETIST, CERTIFIED REGISTERED

## 2017-08-10 PROCEDURE — 77030002996 HC SUT SLK J&J -A: Performed by: UROLOGY

## 2017-08-10 PROCEDURE — 77030018673: Performed by: UROLOGY

## 2017-08-10 PROCEDURE — 77030010939 HC CLP LIG TELE -B: Performed by: UROLOGY

## 2017-08-10 PROCEDURE — 74011250636 HC RX REV CODE- 250/636

## 2017-08-10 PROCEDURE — 74011250636 HC RX REV CODE- 250/636: Performed by: UROLOGY

## 2017-08-10 PROCEDURE — 77030018720 HC DVC LIGSR ATLS COVD -E: Performed by: UROLOGY

## 2017-08-10 PROCEDURE — 77030008771 HC TU NG SALEM SUMP -A: Performed by: NURSE ANESTHETIST, CERTIFIED REGISTERED

## 2017-08-10 PROCEDURE — 77030011640 HC PAD GRND REM COVD -A: Performed by: UROLOGY

## 2017-08-10 PROCEDURE — 76210000006 HC OR PH I REC 0.5 TO 1 HR: Performed by: UROLOGY

## 2017-08-10 PROCEDURE — 74011000250 HC RX REV CODE- 250

## 2017-08-10 PROCEDURE — 77030020255 HC SOL INJ LR 1000ML BG

## 2017-08-10 PROCEDURE — 77030016151 HC PROTCTR LNS DFOG COVD -B: Performed by: UROLOGY

## 2017-08-10 PROCEDURE — 77030035045 HC TRCR ENDOSC VRSPRT BLDLSS COVD -B: Performed by: UROLOGY

## 2017-08-10 PROCEDURE — 77030018390 HC SPNG HEMSTAT2 J&J -B: Performed by: UROLOGY

## 2017-08-10 PROCEDURE — 74011000250 HC RX REV CODE- 250: Performed by: UROLOGY

## 2017-08-10 PROCEDURE — 77030032490 HC SLV COMPR SCD KNE COVD -B: Performed by: UROLOGY

## 2017-08-10 PROCEDURE — 0TT00ZZ RESECTION OF RIGHT KIDNEY, OPEN APPROACH: ICD-10-PCS | Performed by: UROLOGY

## 2017-08-10 PROCEDURE — 88307 TISSUE EXAM BY PATHOLOGIST: CPT | Performed by: UROLOGY

## 2017-08-10 PROCEDURE — 77030018813 HC SCIS LAPSCP EPIX DISP AMR -B: Performed by: UROLOGY

## 2017-08-10 PROCEDURE — 77030009848 HC PASSR SUT SET COOP -C: Performed by: UROLOGY

## 2017-08-10 PROCEDURE — 77030009963 HC RELD STPLR ECR J&J -C: Performed by: UROLOGY

## 2017-08-10 PROCEDURE — 77030031139 HC SUT VCRL2 J&J -A: Performed by: UROLOGY

## 2017-08-10 PROCEDURE — 77030034850: Performed by: UROLOGY

## 2017-08-10 PROCEDURE — 77030008683 HC TU ET CUF COVD -A: Performed by: NURSE ANESTHETIST, CERTIFIED REGISTERED

## 2017-08-10 PROCEDURE — 74011250637 HC RX REV CODE- 250/637: Performed by: NURSE ANESTHETIST, CERTIFIED REGISTERED

## 2017-08-10 PROCEDURE — 77030008518 HC TBNG INSUF ENDO STRY -B: Performed by: UROLOGY

## 2017-08-10 PROCEDURE — 77030019908 HC STETH ESOPH SIMS -A: Performed by: NURSE ANESTHETIST, CERTIFIED REGISTERED

## 2017-08-10 PROCEDURE — 77030010507 HC ADH SKN DERMBND J&J -B: Performed by: UROLOGY

## 2017-08-10 PROCEDURE — 77030036732 HC RELD STPLR VASC J&J -F: Performed by: UROLOGY

## 2017-08-10 PROCEDURE — 77030012022 HC APPL CLP ENDOSC COVD -C: Performed by: UROLOGY

## 2017-08-10 PROCEDURE — 86900 BLOOD TYPING SEROLOGIC ABO: CPT | Performed by: NURSE ANESTHETIST, CERTIFIED REGISTERED

## 2017-08-10 PROCEDURE — 74011000258 HC RX REV CODE- 258: Performed by: UROLOGY

## 2017-08-10 PROCEDURE — 36415 COLL VENOUS BLD VENIPUNCTURE: CPT | Performed by: NURSE ANESTHETIST, CERTIFIED REGISTERED

## 2017-08-10 RX ORDER — NALOXONE HYDROCHLORIDE 0.4 MG/ML
0.4 INJECTION, SOLUTION INTRAMUSCULAR; INTRAVENOUS; SUBCUTANEOUS AS NEEDED
Status: DISCONTINUED | OUTPATIENT
Start: 2017-08-10 | End: 2017-08-13 | Stop reason: HOSPADM

## 2017-08-10 RX ORDER — LIDOCAINE HYDROCHLORIDE 20 MG/ML
INJECTION, SOLUTION EPIDURAL; INFILTRATION; INTRACAUDAL; PERINEURAL AS NEEDED
Status: DISCONTINUED | OUTPATIENT
Start: 2017-08-10 | End: 2017-08-10 | Stop reason: HOSPADM

## 2017-08-10 RX ORDER — ALBUTEROL SULFATE 0.83 MG/ML
2.5 SOLUTION RESPIRATORY (INHALATION)
Status: DISCONTINUED | OUTPATIENT
Start: 2017-08-10 | End: 2017-08-13 | Stop reason: HOSPADM

## 2017-08-10 RX ORDER — DIAZEPAM 5 MG/1
10 TABLET ORAL
Status: DISCONTINUED | OUTPATIENT
Start: 2017-08-10 | End: 2017-08-13 | Stop reason: HOSPADM

## 2017-08-10 RX ORDER — FAMOTIDINE 20 MG/1
20 TABLET, FILM COATED ORAL ONCE
Status: COMPLETED | OUTPATIENT
Start: 2017-08-10 | End: 2017-08-10

## 2017-08-10 RX ORDER — DIPHENHYDRAMINE HYDROCHLORIDE 50 MG/ML
12.5 INJECTION, SOLUTION INTRAMUSCULAR; INTRAVENOUS
Status: DISCONTINUED | OUTPATIENT
Start: 2017-08-10 | End: 2017-08-13 | Stop reason: HOSPADM

## 2017-08-10 RX ORDER — SODIUM CHLORIDE 0.9 % (FLUSH) 0.9 %
5-10 SYRINGE (ML) INJECTION EVERY 8 HOURS
Status: DISCONTINUED | OUTPATIENT
Start: 2017-08-10 | End: 2017-08-11

## 2017-08-10 RX ORDER — KETAMINE HYDROCHLORIDE 50 MG/ML
INJECTION, SOLUTION INTRAMUSCULAR; INTRAVENOUS AS NEEDED
Status: DISCONTINUED | OUTPATIENT
Start: 2017-08-10 | End: 2017-08-10 | Stop reason: HOSPADM

## 2017-08-10 RX ORDER — OXYCODONE AND ACETAMINOPHEN 5; 325 MG/1; MG/1
1 TABLET ORAL AS NEEDED
Qty: 30 TAB | Refills: 0 | Status: SHIPPED | OUTPATIENT
Start: 2017-08-10 | End: 2017-09-21

## 2017-08-10 RX ORDER — CARVEDILOL 6.25 MG/1
6.25 TABLET ORAL 2 TIMES DAILY
Status: DISCONTINUED | OUTPATIENT
Start: 2017-08-10 | End: 2017-08-13 | Stop reason: HOSPADM

## 2017-08-10 RX ORDER — ONDANSETRON 2 MG/ML
4 INJECTION INTRAMUSCULAR; INTRAVENOUS
Status: DISCONTINUED | OUTPATIENT
Start: 2017-08-10 | End: 2017-08-13 | Stop reason: HOSPADM

## 2017-08-10 RX ORDER — HEPARIN SODIUM 5000 [USP'U]/ML
5000 INJECTION, SOLUTION INTRAVENOUS; SUBCUTANEOUS ONCE
Status: COMPLETED | OUTPATIENT
Start: 2017-08-10 | End: 2017-08-10

## 2017-08-10 RX ORDER — ALBUTEROL SULFATE 90 UG/1
2 AEROSOL, METERED RESPIRATORY (INHALATION)
Status: DISCONTINUED | OUTPATIENT
Start: 2017-08-10 | End: 2017-08-10 | Stop reason: CLARIF

## 2017-08-10 RX ORDER — INSULIN LISPRO 100 [IU]/ML
INJECTION, SOLUTION INTRAVENOUS; SUBCUTANEOUS ONCE
Status: DISCONTINUED | OUTPATIENT
Start: 2017-08-10 | End: 2017-08-10 | Stop reason: HOSPADM

## 2017-08-10 RX ORDER — SODIUM CHLORIDE, SODIUM LACTATE, POTASSIUM CHLORIDE, CALCIUM CHLORIDE 600; 310; 30; 20 MG/100ML; MG/100ML; MG/100ML; MG/100ML
75 INJECTION, SOLUTION INTRAVENOUS CONTINUOUS
Status: DISCONTINUED | OUTPATIENT
Start: 2017-08-10 | End: 2017-08-13 | Stop reason: HOSPADM

## 2017-08-10 RX ORDER — CYCLOBENZAPRINE HCL 10 MG
10 TABLET ORAL
Status: DISCONTINUED | OUTPATIENT
Start: 2017-08-10 | End: 2017-08-13 | Stop reason: HOSPADM

## 2017-08-10 RX ORDER — MIDAZOLAM HYDROCHLORIDE 1 MG/ML
INJECTION, SOLUTION INTRAMUSCULAR; INTRAVENOUS AS NEEDED
Status: DISCONTINUED | OUTPATIENT
Start: 2017-08-10 | End: 2017-08-10 | Stop reason: HOSPADM

## 2017-08-10 RX ORDER — ATROPA BELLADONNA AND OPIUM 16.2; 3 MG/1; MG/1
1 SUPPOSITORY RECTAL
Status: DISCONTINUED | OUTPATIENT
Start: 2017-08-10 | End: 2017-08-13 | Stop reason: HOSPADM

## 2017-08-10 RX ORDER — ONDANSETRON 2 MG/ML
INJECTION INTRAMUSCULAR; INTRAVENOUS AS NEEDED
Status: DISCONTINUED | OUTPATIENT
Start: 2017-08-10 | End: 2017-08-10 | Stop reason: SDUPTHER

## 2017-08-10 RX ORDER — SODIUM CHLORIDE, SODIUM LACTATE, POTASSIUM CHLORIDE, CALCIUM CHLORIDE 600; 310; 30; 20 MG/100ML; MG/100ML; MG/100ML; MG/100ML
100 INJECTION, SOLUTION INTRAVENOUS CONTINUOUS
Status: DISCONTINUED | OUTPATIENT
Start: 2017-08-10 | End: 2017-08-10 | Stop reason: HOSPADM

## 2017-08-10 RX ORDER — GABAPENTIN 300 MG/1
300 CAPSULE ORAL 3 TIMES DAILY
Status: DISCONTINUED | OUTPATIENT
Start: 2017-08-10 | End: 2017-08-13 | Stop reason: HOSPADM

## 2017-08-10 RX ORDER — SODIUM CHLORIDE 9 MG/ML
INJECTION, SOLUTION INTRAVENOUS
Status: DISCONTINUED | OUTPATIENT
Start: 2017-08-10 | End: 2017-08-10 | Stop reason: HOSPADM

## 2017-08-10 RX ORDER — GLYCOPYRROLATE 0.2 MG/ML
INJECTION INTRAMUSCULAR; INTRAVENOUS AS NEEDED
Status: DISCONTINUED | OUTPATIENT
Start: 2017-08-10 | End: 2017-08-10 | Stop reason: HOSPADM

## 2017-08-10 RX ORDER — DOCUSATE SODIUM 100 MG/1
100 CAPSULE, LIQUID FILLED ORAL
Qty: 60 CAP | Refills: 2 | Status: SHIPPED | OUTPATIENT
Start: 2017-08-10 | End: 2017-11-08

## 2017-08-10 RX ORDER — HYDROMORPHONE HYDROCHLORIDE 2 MG/ML
0.5 INJECTION, SOLUTION INTRAMUSCULAR; INTRAVENOUS; SUBCUTANEOUS
Status: DISCONTINUED | OUTPATIENT
Start: 2017-08-10 | End: 2017-08-10 | Stop reason: HOSPADM

## 2017-08-10 RX ORDER — DEXTROAMPHETAMINE SACCHARATE, AMPHETAMINE ASPARTATE, DEXTROAMPHETAMINE SULFATE AND AMPHETAMINE SULFATE 1.25; 1.25; 1.25; 1.25 MG/1; MG/1; MG/1; MG/1
15 TABLET ORAL 3 TIMES DAILY
Status: DISCONTINUED | OUTPATIENT
Start: 2017-08-10 | End: 2017-08-13 | Stop reason: HOSPADM

## 2017-08-10 RX ORDER — ONDANSETRON 2 MG/ML
4 INJECTION INTRAMUSCULAR; INTRAVENOUS ONCE
Status: DISCONTINUED | OUTPATIENT
Start: 2017-08-10 | End: 2017-08-10 | Stop reason: HOSPADM

## 2017-08-10 RX ORDER — FENTANYL CITRATE 50 UG/ML
INJECTION, SOLUTION INTRAMUSCULAR; INTRAVENOUS AS NEEDED
Status: DISCONTINUED | OUTPATIENT
Start: 2017-08-10 | End: 2017-08-10 | Stop reason: HOSPADM

## 2017-08-10 RX ORDER — SUCCINYLCHOLINE CHLORIDE 20 MG/ML
INJECTION INTRAMUSCULAR; INTRAVENOUS AS NEEDED
Status: DISCONTINUED | OUTPATIENT
Start: 2017-08-10 | End: 2017-08-10 | Stop reason: HOSPADM

## 2017-08-10 RX ORDER — BUPIVACAINE HYDROCHLORIDE 2.5 MG/ML
INJECTION, SOLUTION EPIDURAL; INFILTRATION; INTRACAUDAL AS NEEDED
Status: DISCONTINUED | OUTPATIENT
Start: 2017-08-10 | End: 2017-08-10 | Stop reason: HOSPADM

## 2017-08-10 RX ORDER — DIPHENHYDRAMINE HYDROCHLORIDE 50 MG/ML
12.5 INJECTION, SOLUTION INTRAMUSCULAR; INTRAVENOUS
Status: DISCONTINUED | OUTPATIENT
Start: 2017-08-10 | End: 2017-08-10 | Stop reason: HOSPADM

## 2017-08-10 RX ORDER — OXYCODONE AND ACETAMINOPHEN 5; 325 MG/1; MG/1
1 TABLET ORAL AS NEEDED
Status: DISCONTINUED | OUTPATIENT
Start: 2017-08-10 | End: 2017-08-10 | Stop reason: HOSPADM

## 2017-08-10 RX ORDER — MAGNESIUM SULFATE 100 %
4 CRYSTALS MISCELLANEOUS AS NEEDED
Status: DISCONTINUED | OUTPATIENT
Start: 2017-08-10 | End: 2017-08-10 | Stop reason: HOSPADM

## 2017-08-10 RX ORDER — VECURONIUM BROMIDE FOR INJECTION 1 MG/ML
INJECTION, POWDER, LYOPHILIZED, FOR SOLUTION INTRAVENOUS AS NEEDED
Status: DISCONTINUED | OUTPATIENT
Start: 2017-08-10 | End: 2017-08-10 | Stop reason: HOSPADM

## 2017-08-10 RX ORDER — DEXAMETHASONE SODIUM PHOSPHATE 4 MG/ML
INJECTION, SOLUTION INTRA-ARTICULAR; INTRALESIONAL; INTRAMUSCULAR; INTRAVENOUS; SOFT TISSUE AS NEEDED
Status: DISCONTINUED | OUTPATIENT
Start: 2017-08-10 | End: 2017-08-10 | Stop reason: HOSPADM

## 2017-08-10 RX ORDER — ACETAMINOPHEN 325 MG/1
325 TABLET ORAL
Status: DISCONTINUED | OUTPATIENT
Start: 2017-08-10 | End: 2017-08-11

## 2017-08-10 RX ORDER — SODIUM CHLORIDE 0.9 % (FLUSH) 0.9 %
5-10 SYRINGE (ML) INJECTION AS NEEDED
Status: DISCONTINUED | OUTPATIENT
Start: 2017-08-10 | End: 2017-08-10 | Stop reason: HOSPADM

## 2017-08-10 RX ORDER — PROPOFOL 10 MG/ML
INJECTION, EMULSION INTRAVENOUS AS NEEDED
Status: DISCONTINUED | OUTPATIENT
Start: 2017-08-10 | End: 2017-08-10 | Stop reason: HOSPADM

## 2017-08-10 RX ORDER — HEPARIN SODIUM 5000 [USP'U]/ML
5000 INJECTION, SOLUTION INTRAVENOUS; SUBCUTANEOUS EVERY 8 HOURS
Status: DISCONTINUED | OUTPATIENT
Start: 2017-08-11 | End: 2017-08-13 | Stop reason: HOSPADM

## 2017-08-10 RX ORDER — CARBAMAZEPINE 200 MG/1
200 TABLET ORAL 3 TIMES DAILY
Status: DISCONTINUED | OUTPATIENT
Start: 2017-08-10 | End: 2017-08-13 | Stop reason: HOSPADM

## 2017-08-10 RX ORDER — SODIUM CHLORIDE 0.9 % (FLUSH) 0.9 %
5-10 SYRINGE (ML) INJECTION AS NEEDED
Status: DISCONTINUED | OUTPATIENT
Start: 2017-08-10 | End: 2017-08-13 | Stop reason: HOSPADM

## 2017-08-10 RX ORDER — NEOSTIGMINE METHYLSULFATE 5 MG/5 ML
SYRINGE (ML) INTRAVENOUS AS NEEDED
Status: DISCONTINUED | OUTPATIENT
Start: 2017-08-10 | End: 2017-08-10 | Stop reason: HOSPADM

## 2017-08-10 RX ORDER — HYDROMORPHONE HYDROCHLORIDE 1 MG/ML
INJECTION, SOLUTION INTRAMUSCULAR; INTRAVENOUS; SUBCUTANEOUS AS NEEDED
Status: DISCONTINUED | OUTPATIENT
Start: 2017-08-10 | End: 2017-08-10 | Stop reason: HOSPADM

## 2017-08-10 RX ORDER — SODIUM CHLORIDE, SODIUM LACTATE, POTASSIUM CHLORIDE, CALCIUM CHLORIDE 600; 310; 30; 20 MG/100ML; MG/100ML; MG/100ML; MG/100ML
50 INJECTION, SOLUTION INTRAVENOUS CONTINUOUS
Status: DISCONTINUED | OUTPATIENT
Start: 2017-08-10 | End: 2017-08-10 | Stop reason: HOSPADM

## 2017-08-10 RX ORDER — HYDROMORPHONE HYDROCHLORIDE 2 MG/ML
0.2 INJECTION, SOLUTION INTRAMUSCULAR; INTRAVENOUS; SUBCUTANEOUS AS NEEDED
Status: DISCONTINUED | OUTPATIENT
Start: 2017-08-10 | End: 2017-08-10 | Stop reason: HOSPADM

## 2017-08-10 RX ORDER — DEXTROSE 50 % IN WATER (D50W) INTRAVENOUS SYRINGE
25-50 AS NEEDED
Status: DISCONTINUED | OUTPATIENT
Start: 2017-08-10 | End: 2017-08-10 | Stop reason: HOSPADM

## 2017-08-10 RX ORDER — LISINOPRIL 40 MG/1
40 TABLET ORAL DAILY
Status: DISCONTINUED | OUTPATIENT
Start: 2017-08-11 | End: 2017-08-13 | Stop reason: HOSPADM

## 2017-08-10 RX ORDER — CEFAZOLIN SODIUM 2 G/50ML
2 SOLUTION INTRAVENOUS
Status: COMPLETED | OUTPATIENT
Start: 2017-08-10 | End: 2017-08-10

## 2017-08-10 RX ADMIN — VECURONIUM BROMIDE FOR INJECTION 2 MG: 1 INJECTION, POWDER, LYOPHILIZED, FOR SOLUTION INTRAVENOUS at 09:54

## 2017-08-10 RX ADMIN — CARBAMAZEPINE 200 MG: 200 TABLET ORAL at 17:32

## 2017-08-10 RX ADMIN — DEXTROAMPHETAMINE SACCHARATE, AMPHETAMINE ASPARTATE, DEXTROAMPHETAMINE SULFATE AND AMPHETAMINE SULFATE 15 MG: 1.25; 1.25; 1.25; 1.25 TABLET ORAL at 17:32

## 2017-08-10 RX ADMIN — KETAMINE HYDROCHLORIDE 50 MG: 50 INJECTION, SOLUTION INTRAMUSCULAR; INTRAVENOUS at 09:35

## 2017-08-10 RX ADMIN — FAMOTIDINE 20 MG: 20 TABLET ORAL at 07:46

## 2017-08-10 RX ADMIN — LIDOCAINE HYDROCHLORIDE 100 MG: 20 INJECTION, SOLUTION EPIDURAL; INFILTRATION; INTRACAUDAL; PERINEURAL at 09:06

## 2017-08-10 RX ADMIN — CARVEDILOL 6.25 MG: 6.25 TABLET, FILM COATED ORAL at 17:32

## 2017-08-10 RX ADMIN — SUCCINYLCHOLINE CHLORIDE 100 MG: 20 INJECTION INTRAMUSCULAR; INTRAVENOUS at 09:07

## 2017-08-10 RX ADMIN — PROPOFOL 150 MG: 10 INJECTION, EMULSION INTRAVENOUS at 09:07

## 2017-08-10 RX ADMIN — Medication: at 14:13

## 2017-08-10 RX ADMIN — GLYCOPYRROLATE 0.4 MG: 0.2 INJECTION INTRAMUSCULAR; INTRAVENOUS at 11:27

## 2017-08-10 RX ADMIN — MIDAZOLAM HYDROCHLORIDE 2 MG: 1 INJECTION, SOLUTION INTRAMUSCULAR; INTRAVENOUS at 09:00

## 2017-08-10 RX ADMIN — SODIUM CHLORIDE, SODIUM LACTATE, POTASSIUM CHLORIDE, AND CALCIUM CHLORIDE 50 ML/HR: 600; 310; 30; 20 INJECTION, SOLUTION INTRAVENOUS at 07:46

## 2017-08-10 RX ADMIN — HEPARIN SODIUM 5000 UNITS: 5000 INJECTION, SOLUTION INTRAVENOUS; SUBCUTANEOUS at 08:10

## 2017-08-10 RX ADMIN — HYDROMORPHONE HYDROCHLORIDE 1 MG: 1 INJECTION, SOLUTION INTRAMUSCULAR; INTRAVENOUS; SUBCUTANEOUS at 08:30

## 2017-08-10 RX ADMIN — ACETAMINOPHEN 325 MG: 325 TABLET, FILM COATED ORAL at 22:33

## 2017-08-10 RX ADMIN — GABAPENTIN 300 MG: 300 CAPSULE ORAL at 22:33

## 2017-08-10 RX ADMIN — DEXTROAMPHETAMINE SACCHARATE, AMPHETAMINE ASPARTATE, DEXTROAMPHETAMINE SULFATE AND AMPHETAMINE SULFATE 15 MG: 1.25; 1.25; 1.25; 1.25 TABLET ORAL at 22:33

## 2017-08-10 RX ADMIN — VECURONIUM BROMIDE FOR INJECTION 3 MG: 1 INJECTION, POWDER, LYOPHILIZED, FOR SOLUTION INTRAVENOUS at 09:39

## 2017-08-10 RX ADMIN — HYDROMORPHONE HYDROCHLORIDE 0.5 MG: 2 INJECTION INTRAMUSCULAR; INTRAVENOUS; SUBCUTANEOUS at 13:08

## 2017-08-10 RX ADMIN — SODIUM CHLORIDE: 9 INJECTION, SOLUTION INTRAVENOUS at 09:10

## 2017-08-10 RX ADMIN — CARBAMAZEPINE 200 MG: 200 TABLET ORAL at 22:33

## 2017-08-10 RX ADMIN — ONDANSETRON 4 MG: 2 INJECTION INTRAMUSCULAR; INTRAVENOUS at 11:20

## 2017-08-10 RX ADMIN — SODIUM CHLORIDE, SODIUM LACTATE, POTASSIUM CHLORIDE, AND CALCIUM CHLORIDE: 600; 310; 30; 20 INJECTION, SOLUTION INTRAVENOUS at 09:57

## 2017-08-10 RX ADMIN — SODIUM CHLORIDE, SODIUM LACTATE, POTASSIUM CHLORIDE, AND CALCIUM CHLORIDE 100 ML/HR: 600; 310; 30; 20 INJECTION, SOLUTION INTRAVENOUS at 13:08

## 2017-08-10 RX ADMIN — Medication 10 ML: at 17:32

## 2017-08-10 RX ADMIN — VECURONIUM BROMIDE FOR INJECTION 4 MG: 1 INJECTION, POWDER, LYOPHILIZED, FOR SOLUTION INTRAVENOUS at 10:34

## 2017-08-10 RX ADMIN — Medication 3 MG: at 11:27

## 2017-08-10 RX ADMIN — SODIUM CHLORIDE, SODIUM LACTATE, POTASSIUM CHLORIDE, AND CALCIUM CHLORIDE 125 ML/HR: 600; 310; 30; 20 INJECTION, SOLUTION INTRAVENOUS at 13:46

## 2017-08-10 RX ADMIN — CEFAZOLIN SODIUM 1 G: 1 INJECTION, POWDER, FOR SOLUTION INTRAMUSCULAR; INTRAVENOUS at 17:31

## 2017-08-10 RX ADMIN — KETAMINE HYDROCHLORIDE 10 MG: 50 INJECTION, SOLUTION INTRAMUSCULAR; INTRAVENOUS at 11:11

## 2017-08-10 RX ADMIN — GABAPENTIN 300 MG: 300 CAPSULE ORAL at 17:32

## 2017-08-10 RX ADMIN — OXYCODONE HYDROCHLORIDE AND ACETAMINOPHEN 1 TABLET: 5; 325 TABLET ORAL at 13:06

## 2017-08-10 RX ADMIN — VECURONIUM BROMIDE FOR INJECTION 2 MG: 1 INJECTION, POWDER, LYOPHILIZED, FOR SOLUTION INTRAVENOUS at 11:07

## 2017-08-10 RX ADMIN — CEFAZOLIN SODIUM 1 G: 1 INJECTION, POWDER, FOR SOLUTION INTRAMUSCULAR; INTRAVENOUS at 22:33

## 2017-08-10 RX ADMIN — HYDROMORPHONE HYDROCHLORIDE 1 MG: 1 INJECTION, SOLUTION INTRAMUSCULAR; INTRAVENOUS; SUBCUTANEOUS at 09:13

## 2017-08-10 RX ADMIN — FENTANYL CITRATE 100 MCG: 50 INJECTION, SOLUTION INTRAMUSCULAR; INTRAVENOUS at 09:06

## 2017-08-10 RX ADMIN — VECURONIUM BROMIDE FOR INJECTION 5 MG: 1 INJECTION, POWDER, LYOPHILIZED, FOR SOLUTION INTRAVENOUS at 09:14

## 2017-08-10 RX ADMIN — CEFAZOLIN SODIUM 2 G: 2 SOLUTION INTRAVENOUS at 09:15

## 2017-08-10 RX ADMIN — HYDROMORPHONE HYDROCHLORIDE 0.5 MG: 2 INJECTION INTRAMUSCULAR; INTRAVENOUS; SUBCUTANEOUS at 12:21

## 2017-08-10 RX ADMIN — DEXAMETHASONE SODIUM PHOSPHATE 4 MG: 4 INJECTION, SOLUTION INTRA-ARTICULAR; INTRALESIONAL; INTRAMUSCULAR; INTRAVENOUS; SOFT TISSUE at 09:13

## 2017-08-10 NOTE — PROCEDURES
DATE  8/10/2017      PREOPERATIVE DIAGNOSIS:   Right Renal Mass    POSTOPERATIVE DIAGNOSIS:   Same    OPERATION PERFORMED:   Hand-assisted laparoscopic right nephrectomy. SURGEON:  Ayaka Gonzales MD    RESIDENT:   None    ASSISTANT SURGEON:  None    ANESTHESIA:   General.     ESTIMATED BLOOD LOSS:   50cc    DRAINS:   16-French Chinchilla catheter. COMPLICATIONS:   None. SPECIMEN    Right kidney and partial ureter    INDICATIONS FOR PROCEDURE:     7cm Right Renal mass    FINDINGS:    2 arteries, 1 mariam    DESCRIPTION OF OPERATION:   Informed consent was obtained. The patient was marked on the right side. IV antibiotics were given for bacterial prophylaxis on call to the operating room. Subcutaneous Heparin and SCDs were provided for DVT prophylaxis. The patient was taken to the operating room and placed supine on the operating table. General anesthesia was provided. A Chinchilla catheter was placed to drain the bladder. The patient was positioned in left lateral decubitus with the right flank elevated about 70 degrees and the table flexed slightly. The right arm was placed in a padded airplane for support. Axillary roll was positioned. The patient was secured to the table with soft straps and then prepped and draped sterilely. We had a time-out confirming the patient identification, planned procedure, surgical site, and all present were in agreement. An 8cm  hand port incision was made in the right lower abdomen and carried down through the fascia and muscle layers. The peritoneum was incised. An incisional block was provided with Marcaine. The GelPort was assembled. A 12 mm trocar was placed above the umbilicus, and then the abdomen was insufflated. Laparoscopic survey revealed no abnormalities or injuries. A second 12 mm trocar was placed at the subxiphoid location to triangulate the kidney. All port sites were then infiltrated with liposomal Marcaine.  Zero Vicryls were placed at the 12 mm trocar sites with the Trell-Rene device for closure at the end of the case. The white line of Toldt was incised. The colon was reflected from the liver to the pelvis. The duodenum was Kocherized. Gerota's fascia was lifted up off the lower pole of the kidney and the ureter was identified and mobilized. The renal hilum was mobilized and hilar vessels were exposed. The lateral and upper pole attachments were divided with Ligasure. The adrenal gland was spared. The hilar vessels were divided, artery first, then vein, with endovascular grey staple loads using the battery powered stapler. The hilum was hemostatic. The kidney was placed in an endocatch bag and extracted and sent to pathology. Pneumoperitoneal pressure was reduced to 7 mmHg and the abdomen was inspected; hemostasis was confirmed. The 12 mm trocars were removed and the port sites closed with previously placed 0 Vicryl. The Gelport was removed. The extraction incision was closed with a running #1 PDS, incorporating all the muscle and fascial layers. All the incisions were irrigated, patted dry, and then the skin was reapproximated with 4-0 Monocryl in a subcuticular fashion. The wounds were cleaned and dried and covered with Dermabond. All sponge, needle, and instrument counts were reported correct x2. The patient was awakened from anesthesia and transferred to recovery in stable condition. There were no complications.  The patient tolerated the procedure well.    ______________________________         Katt Carrion MD  [facility]

## 2017-08-10 NOTE — ANESTHESIA POSTPROCEDURE EVALUATION
Post-Anesthesia Evaluation and Assessment    Patient: Sara Mendoza MRN: [id number]  SSN: [id number]    YOB: 1960  Age: 55 y.o. Sex: male      Data from PACU flowsheet    Cardiovascular Function/Vital Signs  Visit Vitals    /67    Pulse (!) 51    Temp 36.7 °C (98.1 °F)    Resp 15    Ht 5' 9\" (1.753 m)    Wt 90.5 kg (199 lb 8 oz)    SpO2 93%    BMI 29.46 kg/m2       Patient is status post general anesthesia for Procedure(s):  RIGHT HAND LAPAROSCOPIC NEPHRECTOMY. Nausea/Vomiting: controlled    Postoperative hydration reviewed and adequate. Pain:  Pain Scale 1: Visual (08/10/17 1234)  Pain Intensity 1: 8 (08/10/17 1221)   Managed      Mental Status and Level of Consciousness: Alert and oriented     Pulmonary Status:   O2 Device: Room air (08/10/17 1222)   Adequate oxygenation and airway patent    Complications related to anesthesia: None    Post-anesthesia assessment completed.  No concerns    Signed By: Armida Hernández MD     August 10, 2017

## 2017-08-10 NOTE — PERIOP NOTES
1153 Patient arrived to PACU via bed, VSS, bedside report complete, will continue to monitor  1237 Asked patient if he wanted me to call anyone for him to give updated, patient refused  TRANSFER - OUT REPORT:    Verbal report given to [address] [facility]  being transferred to Room 2212 for routine post - op       Report consisted of patients Situation, Background, Assessment and   Recommendations(SBAR). Information from the following report(s) SBAR, OR Summary, Intake/Output and MAR was reviewed with the receiving nurse. Lines:   Peripheral IV 08/10/17 Left Hand (Active)   Site Assessment Clean, dry, & intact 8/10/2017 12:22 PM   Phlebitis Assessment 0 8/10/2017 12:22 PM   Infiltration Assessment 0 8/10/2017 12:22 PM   Dressing Status Clean, dry, & intact 8/10/2017 12:22 PM   Dressing Type Stabilization/securement device;Transparent 8/10/2017 12:22 PM   Hub Color/Line Status Infusing 8/10/2017 12:22 PM       Peripheral IV 08/10/17 Right Hand (Active)   Site Assessment Clean, dry, & intact 8/10/2017 12:22 PM   Phlebitis Assessment 0 8/10/2017 12:22 PM   Infiltration Assessment 0 8/10/2017 12:22 PM   Dressing Status Clean, dry, & intact 8/10/2017 12:22 PM   Dressing Type Tape;Transparent 8/10/2017 12:22 PM   Hub Color/Line Status Capped 8/10/2017 12:22 PM        Opportunity for questions and clarification was provided.       Patient transported with:   Registered Nurse

## 2017-08-10 NOTE — PROGRESS NOTES
Problem: Falls - Risk of  Goal: *Absence of Falls  Document Ama Fall Risk and appropriate interventions in the flowsheet.   Outcome: Progressing Towards Goal  Fall Risk Interventions:  Mobility Interventions: Communicate number of staff needed for ambulation/transfer, Patient to call before getting OOB           Medication Interventions: Patient to call before getting OOB, Teach patient to arise slowly     Elimination Interventions: Call light in reach, Patient to call for help with toileting needs

## 2017-08-10 NOTE — IP AVS SNAPSHOT
Barron Lucia 
 
 
 [address] [place] [address] [address] Patient: Shannan Gomez MRN: [id number] :1960 Current Discharge Medication List  
  
START taking these medications Dose & Instructions Dispensing Information Comments Morning Noon Evening Bedtime  
 docusate sodium 100 mg capsule Commonly known as:  Heriberto Linmalinda Your last dose was: Your next dose is:    
   
   
 Dose:  100 mg Take 1 Cap by mouth two (2) times daily as needed for Constipation for up to 90 days. Quantity:  60 Cap Refills:  2  
     
   
   
   
  
 oxyCODONE-acetaminophen 5-325 mg per tablet Commonly known as:  PERCOCET Your last dose was: Your next dose is:    
   
   
 Dose:  1 Tab Take 1 Tab by mouth as needed. Quantity:  30 Tab Refills:  0 CONTINUE these medications which have NOT CHANGED Dose & Instructions Dispensing Information Comments Morning Noon Evening Bedtime ADDERALL 10 mg tablet Generic drug:  dextroamphetamine-amphetamine Your last dose was: Your next dose is:    
   
   
 Dose:  15 mg Take 15 mg by mouth three (3) times daily. Refills:  0  
     
   
   
   
  
 CARBATROL PO Your last dose was: Your next dose is:    
   
   
 Dose:  200 mg Take 200 mg by mouth three (3) times daily. Refills:  0  
     
   
   
   
  
 CARVEDILOL PO Your last dose was: Your next dose is:    
   
   
 Dose:  6.25 mg Take 6.25 mg by mouth two (2) times a day. Refills:  0  
     
   
   
   
  
 cyclobenzaprine 10 mg tablet Commonly known as:  FLEXERIL Your last dose was: Your next dose is:    
   
   
 TK 1 T PO TID Refills:  1  
     
   
   
   
  
 diazePAM 10 mg tablet Commonly known as:  VALIUM Your last dose was: Your next dose is:    
   
   
 TK 1 AND / T PO QHS PRN Refills:  1 ELIQUIS PO Your last dose was: Your next dose is:    
   
   
 Dose:  5 mg Take 5 mg by mouth two (2) times a day. Refills:  0  
     
   
   
   
  
 gabapentin 300 mg capsule Commonly known as:  NEURONTIN Your last dose was: Your next dose is:    
   
   
 TK 1 C PO TID Refills:  2  
     
   
   
   
  
 ibuprofen 100 mg tablet Your last dose was: Your next dose is:    
   
   
 Dose:  800 mg Take 800 mg by mouth daily. Refills:  0 LISINOPRIL PO Your last dose was: Your next dose is:    
   
   
 Dose:  40 mg Take 40 mg by mouth daily. Refills:  0  
     
   
   
   
  
 multivitamin tablet Commonly known as:  ONE A DAY Your last dose was: Your next dose is:    
   
   
 Dose:  1 Tab Take 1 Tab by mouth daily. Refills:  0  
     
   
   
   
  
 NUCYNTA 50 mg tablet Generic drug:  tapentadol Your last dose was: Your next dose is: Take 1 Tab by Mouth 3 Times Daily As Needed. Refills:  0 VENTOLIN HFA 90 mcg/actuation inhaler Generic drug:  albuterol Your last dose was: Your next dose is:    
   
   
 Dose:  2 Puff Take 2 Puffs by inhalation every four (4) hours as needed for Wheezing. Refills:  0 Where to Get Your Medications Information on where to get these meds will be given to you by the nurse or doctor. ! Ask your nurse or doctor about these medications  
  docusate sodium 100 mg capsule  
 oxyCODONE-acetaminophen 5-325 mg per tablet

## 2017-08-10 NOTE — PROGRESS NOTES
Received report from Jc Treviño, [address] [facility]. Pt resting in bed, white board updated, call bell within reach. Pt educated on how to use PCA key with teach back. Incisions c/d/i. Chinchilla draining clear yellow urine. 1635 Pt complaining of pain, told pt to push PCA button when he needs medication. Pt then pushed button about 10-15 times. Educated pt to push button every 6 minutes, as he won't be able to get any medication before the 6 minute lock out is up.     1722 [place] PCA button alert in hallway about every minute. Re-educated pt on how to push button and when for optimal pain control. Called dietary for popsicles. 1805 Dietary has not brought up popsicles. Called dietary again. 1835 Pt really wanting popsicles, dietary did not deliver to floor or to pt. Bedside shift change report given to Mati Tong RN (oncoming nurse) by Kenn Garcia RN (offgoing nurse). Report included the following information SBAR, Kardex, OR Summary, Intake/Output, MAR and Recent Results.

## 2017-08-10 NOTE — H&P
Sudarshan Dickinson   1960  55 y.o.  2017         ICD-10-CM ICD-9-CM   1. Right renal mass N28.89 593.9         Assessment:  1. Pt is a 55 y.o.  male with 7.1cm R renal mass  2. TBI   3. Seizures   4. Umbilical hernia with mesh  5. Pain management     Plan:  Reviewed pt's MRI ABD W W/O Cont in detail. Discussed nephrectomy of right renal mass partial vs total.   After discussion patient wants to proceed with right radical nephrectomy. Schedule OR time for a Right Hand Assited Laparoscopic Nephrectomy. Will require Cardiology clearance w/ Dr. Louise Hernandez prior to procedure. Return for scheduled procedure date.     DISCUSSION:  We discussed small renal mass management in detail. I outlined the potential for cancerous and noncancerous histology (cancerous histology being far more likely for solid enhancing masses and for highly complex cystic lesions--Bosniak classification outlined). I described the evolving role of renal mass biopsy which is currently utilized selectively but not routinely. The risks and benefits of biopsy were outlined. The role of surveillance in management of small renal masses was outlined. The risk of metastasis increases as the size of solid renal mass increases. In general, it is believed that the risk of metastasis for renal masses less than 3-4 cm is small (up to approximately 5%) based mainly on large retrospective studies. In some cases and especially in patients of older age and multiple comorbidities a surveillance approach may be appropriate. The treatment of solid renal masses includes: surveillance, cryoablation (percutaneous and laparoscopic) and partial and total nephrectomy (each with option of laparoscopic, robotic and open approach depending on individualized features).  Furthermore, nephrectomy appears to be an independent risk factor for the development of chronic kidney disease suggesting that nephron sparing approaches should be implored whenever feasible. We reviewed these options in context of the patients current situation as well as the pros and cons of each.     Patients films were reviewed together with the patient, and all patient questions were answered. Management was decided on as outlined above.     We discussed management options for patient's renal tumor including observation, nephrectomy, partial nephrectomy -- lap or open, and cryoablation. I recommend nephrectomy and discussed possible injury to surrounding organs, possible benign path or positive margins, and global anesthesia risks including but not limited to CVA, MI, DVT, PE, pneumonia, and death. The patient understands and desires to proceed.               Chief Complaint   Patient presents with    Renal Mass      Subjective:  Samir Carlson is a 55 y.o.  male who returns in follow up for right renal mass. Initially, pt referred for findings on a CT A/P W cont which revealed a 5.1 cm right renal mass and 20 mm right renal mass. Pt's larger mass is located at pt's mid to lower pole and is concerning for Renal Cell Carcinoma. The second mass is located at the right lower pole cortex and may represent a complex cyst.      Last visit an MRI ABD W W/O Cont was suggested to further evaluate masses. Imaging confirms a 7.1 cm right lower pole solid mass with cystic necrosis considered renal cell carcinoma until proven otherwise. There is no evidence of metastatic disease. Pt would like to move forward w/ surgical treatment of his mass.    He has an abdominal surgical h/o umbilical hernia repair.      There is no family history of RCC  Thte patient reports no bone pain  The patient reports no wt loss  The patient reports no changes in energy or appetite level  The patient reports no smoking history  ECO     The patient has objective/subjective evidence of the following paraneoplastic syndromes:  Hypercalcemia:  None  Alice's syndrome: None  Anemia: None  Thrombocytosis: None  Cachexia: None     Creatinine: 0.9 5/23/2017     Radiology:     MRI ABD W WO Cont 7/7/2017  IMPRESSION:    1.  Heterogeneous 7.1 cm right lower pole solid mass with cystic necrosis considered renal cell carcinoma until proven otherwise.  No evidence of hepatic metastasis or renal vein thrombosis.  Lower pole simple cyst also identified. 2. No metastatic disease. 3.  Tiny 3-mm left cyst.     4.  Colonic diverticula, no diverticulitis. Kidneys/ureters:  Right kidney lower pole has a heterogeneously enhancing solid mass which has central necrosis.  This measures 7.2 x 4.8 x 4.8 cm in the caudocranial, transverse, and AP dimensions.  Per report, it measured 5.1 cm in long axis.  More anteromedially in the right kidney is a simple-appearing 1.2 x 1.2 simple cyst.  The right renal vein is patent.  Left kidney mid pole has a 3 mm cyst.          CT A/P W Cont 6/6/2017               Past Medical History:   Diagnosis Date    Arrhythmia      Arthritis      Asthma      Hypertension      Insomnia      Seizures ([facility] [address] 2002    Traumatic brain injury, closed ([facility] [address] 1999            Past Surgical History:   Procedure Laterality Date    HX HERNIA REPAIR        HX ORTHOPAEDIC         FOOT & KNEE SX    NEUROLOGICAL PROCEDURE UNLISTED         BACK & NECK SX      History reviewed.  No pertinent family history.          Social History   Substance Use Topics    Smoking status: Never Smoker    Smokeless tobacco: Never Used    Alcohol use No             Current Outpatient Prescriptions   Medication Sig Dispense Refill    diazePAM (VALIUM) 10 mg tablet TK 1 AND 1/2 T PO QHS PRN   1    gabapentin (NEURONTIN) 300 mg capsule TK 1 C PO TID   2    cyclobenzaprine (FLEXERIL) 10 mg tablet TK 1 T PO TID   1    tapentadol (NUCYNTA) 50 mg tablet Take 1 Tab by Mouth 3 Times Daily As Needed.        dextroamphetamine-amphetamine (ADDERALL) 10 mg tablet Take 10 mg by mouth two (2) times a day.                ibuprofen 100 mg tablet Take 800 mg by mouth daily.        albuterol (VENTOLIN HFA) 90 mcg/actuation inhaler Take 2 Puffs by inhalation every four (4) hours as needed for Wheezing.        CARBAMAZEPINE (CARBATROL PO) Take 200 mg by mouth three (3) times daily.        CARVEDILOL PO Take 6.25 mg by mouth two (2) times a day.        LISINOPRIL PO Take 40 mg by mouth daily.        APIXABAN (ELIQUIS PO) Take 5 mg by mouth two (2) times a day.        multivitamin (ONE A DAY) tablet Take 1 Tab by mouth daily.               Allergies   Allergen Reactions    Fentanyl Rash    Morphine Other (comments)    Statins-Hmg-Coa Reductase Inhibitors Myalgia         Review of Systems  Constitutional: Fever: No  Skin: Rash: No  HEENT: Hearing difficulty: No  Eyes: Blurred vision: No  Cardiovascular: Chest pain: No  Respiratory: Shortness of breath: No  Gastrointestinal: Nausea/vomiting: No  Musculoskeletal: Back pain: Yes  chronic back pain  Neurological: Weakness: No  Psychological: Memory loss: No  Comments/additional findings:         Objective:       Visit Vitals    /80    Ht 5' 9\" (1.753 m)    Wt 204 lb (92.5 kg)    BMI 30.13 kg/m2   Constitutional: WDWN, Pleasant and appropriate affect, No acute distress. HEENT: EOMs in tact  Respiratory: No respiratory distress or difficulties  CV:  No peripheral swelling noted  Abdomen:  No abdominal masses or tenderness. Skin: Normal color and texture and No rashes or erythema noted  Neuro/Psych:  Alert and Oriented x3, affect appropriate. EXT: no cyanosis or edema          Labs  None.         Edwin Duran MD  [facility]  [address] [facility], [address] [facility]  Phone: [phone]  Pager: [phone]      History and physical review. The patient has been examined. There have been no significant clinical changes.     NAD, CTAB, RRR    R Side marked  Ancef On call to [address] [place] [address] to proceed with Right HA Lap NX

## 2017-08-10 NOTE — ANESTHESIA PREPROCEDURE EVALUATION
Anesthetic History   No history of anesthetic complications            Review of Systems / Medical History  Patient summary reviewed and pertinent labs reviewed    Pulmonary            Asthma : well controlled       Neuro/Psych     seizures: well controlled         Cardiovascular    Hypertension        Dysrhythmias : atrial fibrillation           GI/Hepatic/Renal  Within defined limits              Endo/Other        Arthritis     Other Findings   Comments:   Risk Factors for Postoperative nausea/vomiting:       History of postoperative nausea/vomiting? NO       Female? NO       Motion sickness? NO       Intended opioid administration for postoperative analgesia? YES      Smoking Abstinence  Current Smoker? NO  Elective Surgery? YES  Seen preoperatively by anesthesiologist or proxy prior to day of surgery? YES  Pt abstained from smoking 24 hours prior to anesthesia?  N/A           Physical Exam    Airway  Mallampati: II  TM Distance: 4 - 6 cm  Neck ROM: normal range of motion   Mouth opening: Normal     Cardiovascular               Dental    Dentition: Full upper dentures     Pulmonary                 Abdominal  GI exam deferred       Other Findings            Anesthetic Plan    ASA: 3  Anesthesia type: general          Induction: Intravenous  Anesthetic plan and risks discussed with: Patient

## 2017-08-10 NOTE — IP AVS SNAPSHOT
Alyssa Ramirez 
 
 
 [address] [place] [address] [address] Patient: Jj Bassett MRN: [id number] :1960 You are allergic to the following Allergen Reactions Fentanyl Rash With fentanyl patch only Morphine Other (comments) Statins-Hmg-Coa Reductase Inhibitors Myalgia Leg cramps Recent Documentation Height Weight BMI Smoking Status 1.753 m 90.5 kg 29.46 kg/m2 Never Smoker Emergency Contacts Name Discharge Info Relation Home Work Mobile Evgeny Dickinson N/A  AT THIS TIME [6] Brother [24]   [phone] [address] CAREGIVER [3] Other Relative [6]   [phone] Zoe Dickinson DISCHARGE CAREGIVER [3] Spouse [3]   [phone] About your hospitalization You were admitted on:  August 10, 2017 You last received care in the:  [address] [place] [address],[address] You were discharged on:  2017 Unit phone number:  [phone] Why you were hospitalized Your primary diagnosis was:  Not on File Your diagnoses also included:  Renal Mass Providers Seen During Your Hospitalizations Provider Role Specialty Primary office phone Edwin Duran MD Attending Provider Urology [phone] Your Primary Care Physician (PCP) Primary Care Physician Office Phone Office Fax Carli Wall [phone] Follow-up Information Follow up With Details Comments Contact Info Patience Puckett NP   [address] 
[phone] Your Appointments 2017 10:15 AM EDT  
ESTABLISHED PATIENT with Edwin Duran MD  
[facility] [address] [facility] 300 2[address] [facility] [address]  
[phone] Current Discharge Medication List  
  
START taking these medications Dose & Instructions Dispensing Information Comments Morning Noon Evening Bedtime  
 docusate sodium 100 mg capsule Commonly known as:  Walterine Shamokin Your last dose was: Your next dose is:    
   
   
 Dose:  100 mg Take 1 Cap by mouth two (2) times daily as needed for Constipation for up to 90 days. Quantity:  60 Cap Refills:  2  
     
   
   
   
  
 oxyCODONE-acetaminophen 5-325 mg per tablet Commonly known as:  PERCOCET Your last dose was: Your next dose is:    
   
   
 Dose:  1 Tab Take 1 Tab by mouth as needed. Quantity:  30 Tab Refills:  0 CONTINUE these medications which have NOT CHANGED Dose & Instructions Dispensing Information Comments Morning Noon Evening Bedtime ADDERALL 10 mg tablet Generic drug:  dextroamphetamine-amphetamine Your last dose was: Your next dose is:    
   
   
 Dose:  15 mg Take 15 mg by mouth three (3) times daily. Refills:  0  
     
   
   
   
  
 CARBATROL PO Your last dose was: Your next dose is:    
   
   
 Dose:  200 mg Take 200 mg by mouth three (3) times daily. Refills:  0  
     
   
   
   
  
 CARVEDILOL PO Your last dose was: Your next dose is:    
   
   
 Dose:  6.25 mg Take 6.25 mg by mouth two (2) times a day. Refills:  0  
     
   
   
   
  
 cyclobenzaprine 10 mg tablet Commonly known as:  FLEXERIL Your last dose was: Your next dose is:    
   
   
 TK 1 T PO TID Refills:  1  
     
   
   
   
  
 diazePAM 10 mg tablet Commonly known as:  VALIUM Your last dose was: Your next dose is:    
   
   
 TK 1 AND 1/2 T PO QHS PRN Refills:  1 ELIQUIS PO Your last dose was: Your next dose is:    
   
   
 Dose:  5 mg Take 5 mg by mouth two (2) times a day. Refills:  0  
     
   
   
   
  
 gabapentin 300 mg capsule Commonly known as:  NEURONTIN  
 Your last dose was: Your next dose is:    
   
   
 TK 1 C PO TID Refills:  2  
     
   
   
   
  
 ibuprofen 100 mg tablet Your last dose was: Your next dose is:    
   
   
 Dose:  800 mg Take 800 mg by mouth daily. Refills:  0 LISINOPRIL PO Your last dose was: Your next dose is:    
   
   
 Dose:  40 mg Take 40 mg by mouth daily. Refills:  0  
     
   
   
   
  
 multivitamin tablet Commonly known as:  ONE A DAY Your last dose was: Your next dose is:    
   
   
 Dose:  1 Tab Take 1 Tab by mouth daily. Refills:  0  
     
   
   
   
  
 NUCYNTA 50 mg tablet Generic drug:  tapentadol Your last dose was: Your next dose is: Take 1 Tab by Mouth 3 Times Daily As Needed. Refills:  0 VENTOLIN HFA 90 mcg/actuation inhaler Generic drug:  albuterol Your last dose was: Your next dose is:    
   
   
 Dose:  2 Puff Take 2 Puffs by inhalation every four (4) hours as needed for Wheezing. Refills:  0 Where to Get Your Medications Information on where to get these meds will be given to you by the nurse or doctor. ! Ask your nurse or doctor about these medications  
  docusate sodium 100 mg capsule  
 oxyCODONE-acetaminophen 5-325 mg per tablet Discharge Instructions None Discharge Orders None [employer] Announcement We are excited to announce that we are making your provider's discharge notes available to you in [employer]. You will see these notes when they are completed and signed by the physician that discharged you from your recent hospital stay. If you have any questions or concerns about any information you see in [employer]t, please call the Health Information Department where you were seen or reach out to your Primary Care Provider for more information about your plan of care. Introducing \[facility] [place] [facility]\"" & HEALTH SERVICES! [facility] introduces [employer]ronic patient portal. Now you can access parts of your medical record, email your doctor's office, and request medication refills online. 1. In your internet browser, go to [email or web address] 2. Click on the First Time User? Click Here link in the Sign In box. You will see the New Member Sign Up page. 3. Enter your K[employer]ronic Access Code exactly as it appears below. You will not need to use this code after youve completed the sign-up process. If you do not sign up before the expiration date, you must request a new code. · K[employer]ronic Access Code: [email or web address] Expires: 9/20/2017 12:11 PM 
 
4. Enter the last four digits of your Social Security Number (xxxx) and Date of Birth (mm/dd/yyyy) as indicated and click Submit. You will be taken to the next sign-up page. 5. Create a Kiptronic ID. This will be your Kiptronic login ID and cannot be changed, so think of one that is secure and easy to remember. 6. Create a K[employer]ronic password. You can change your password at any time. 7. Enter your Password Reset Question and Answer. This can be used at a later time if you forget your password. 8. Enter your e-mail address. You will receive e-mail notification when new information is available in [address] 9. Click Sign Up. You can now view and download portions of your medical record. 10. Click the Download Summary menu link to download a portable copy of your medical information. If you have questions, please visit the Frequently Asked Questions section of the [employer] website. Remember, K[employer]ronic is NOT to be used for urgent needs. For medical emergencies, dial 911. Now available from your iPhone and Android! General Information Please provide this summary of care documentation to your next provider. Patient Signature:  ____________________________________________________________ Date:  ____________________________________________________________  
  
Jerral [place] Provider Signature:  ____________________________________________________________ Date:  ____________________________________________________________

## 2017-08-11 LAB
ANION GAP BLD CALC-SCNC: 7 MMOL/L (ref 3–18)
BUN SERPL-MCNC: 16 MG/DL (ref 7–18)
BUN/CREAT SERPL: 13 (ref 12–20)
CALCIUM SERPL-MCNC: 8.3 MG/DL (ref 8.5–10.1)
CHLORIDE SERPL-SCNC: 103 MMOL/L (ref 100–108)
CO2 SERPL-SCNC: 27 MMOL/L (ref 21–32)
CREAT SERPL-MCNC: 1.24 MG/DL (ref 0.6–1.3)
ERYTHROCYTE [DISTWIDTH] IN BLOOD BY AUTOMATED COUNT: 13.3 % (ref 11.6–14.5)
GLUCOSE SERPL-MCNC: 122 MG/DL (ref 74–99)
HCT VFR BLD AUTO: 37.5 % (ref 36–48)
HGB BLD-MCNC: 12.3 G/DL (ref 13–16)
MCH RBC QN AUTO: 31.9 PG (ref 24–34)
MCHC RBC AUTO-ENTMCNC: 32.8 G/DL (ref 31–37)
MCV RBC AUTO: 97.2 FL (ref 74–97)
PLATELET # BLD AUTO: 142 K/UL (ref 135–420)
PMV BLD AUTO: 10.7 FL (ref 9.2–11.8)
POTASSIUM SERPL-SCNC: 4.2 MMOL/L (ref 3.5–5.5)
RBC # BLD AUTO: 3.86 M/UL (ref 4.7–5.5)
SODIUM SERPL-SCNC: 137 MMOL/L (ref 136–145)
WBC # BLD AUTO: 5.9 K/UL (ref 4.6–13.2)

## 2017-08-11 PROCEDURE — 74011250636 HC RX REV CODE- 250/636: Performed by: UROLOGY

## 2017-08-11 PROCEDURE — 36415 COLL VENOUS BLD VENIPUNCTURE: CPT | Performed by: UROLOGY

## 2017-08-11 PROCEDURE — 74011250637 HC RX REV CODE- 250/637: Performed by: UROLOGY

## 2017-08-11 PROCEDURE — 74011000258 HC RX REV CODE- 258: Performed by: UROLOGY

## 2017-08-11 PROCEDURE — 65270000029 HC RM PRIVATE

## 2017-08-11 PROCEDURE — 85027 COMPLETE CBC AUTOMATED: CPT | Performed by: UROLOGY

## 2017-08-11 PROCEDURE — 77030027138 HC INCENT SPIROMETER -A

## 2017-08-11 PROCEDURE — 77030020255 HC SOL INJ LR 1000ML BG

## 2017-08-11 PROCEDURE — 80048 BASIC METABOLIC PNL TOTAL CA: CPT | Performed by: UROLOGY

## 2017-08-11 RX ORDER — ACETAMINOPHEN 325 MG/1
650 TABLET ORAL
Status: DISCONTINUED | OUTPATIENT
Start: 2017-08-11 | End: 2017-08-13 | Stop reason: HOSPADM

## 2017-08-11 RX ADMIN — LISINOPRIL 40 MG: 40 TABLET ORAL at 09:04

## 2017-08-11 RX ADMIN — HEPARIN SODIUM 5000 UNITS: 5000 INJECTION, SOLUTION INTRAVENOUS; SUBCUTANEOUS at 16:00

## 2017-08-11 RX ADMIN — CARBAMAZEPINE 200 MG: 200 TABLET ORAL at 22:03

## 2017-08-11 RX ADMIN — GABAPENTIN 300 MG: 300 CAPSULE ORAL at 16:00

## 2017-08-11 RX ADMIN — CARBAMAZEPINE 200 MG: 200 TABLET ORAL at 16:00

## 2017-08-11 RX ADMIN — ONDANSETRON 4 MG: 2 INJECTION INTRAMUSCULAR; INTRAVENOUS at 18:30

## 2017-08-11 RX ADMIN — SODIUM CHLORIDE, SODIUM LACTATE, POTASSIUM CHLORIDE, AND CALCIUM CHLORIDE 125 ML/HR: 600; 310; 30; 20 INJECTION, SOLUTION INTRAVENOUS at 05:52

## 2017-08-11 RX ADMIN — DIAZEPAM 10 MG: 5 TABLET ORAL at 22:08

## 2017-08-11 RX ADMIN — HEPARIN SODIUM 5000 UNITS: 5000 INJECTION, SOLUTION INTRAVENOUS; SUBCUTANEOUS at 05:53

## 2017-08-11 RX ADMIN — DEXTROAMPHETAMINE SACCHARATE, AMPHETAMINE ASPARTATE, DEXTROAMPHETAMINE SULFATE AND AMPHETAMINE SULFATE 15 MG: 1.25; 1.25; 1.25; 1.25 TABLET ORAL at 16:00

## 2017-08-11 RX ADMIN — GABAPENTIN 300 MG: 300 CAPSULE ORAL at 09:04

## 2017-08-11 RX ADMIN — CARVEDILOL 6.25 MG: 6.25 TABLET, FILM COATED ORAL at 17:16

## 2017-08-11 RX ADMIN — ACETAMINOPHEN 325 MG: 325 TABLET, FILM COATED ORAL at 05:59

## 2017-08-11 RX ADMIN — CEFAZOLIN SODIUM 1 G: 1 INJECTION, POWDER, FOR SOLUTION INTRAMUSCULAR; INTRAVENOUS at 05:53

## 2017-08-11 RX ADMIN — DEXTROAMPHETAMINE SACCHARATE, AMPHETAMINE ASPARTATE, DEXTROAMPHETAMINE SULFATE AND AMPHETAMINE SULFATE 15 MG: 1.25; 1.25; 1.25; 1.25 TABLET ORAL at 09:05

## 2017-08-11 RX ADMIN — SODIUM CHLORIDE, SODIUM LACTATE, POTASSIUM CHLORIDE, AND CALCIUM CHLORIDE 125 ML/HR: 600; 310; 30; 20 INJECTION, SOLUTION INTRAVENOUS at 22:10

## 2017-08-11 RX ADMIN — HEPARIN SODIUM 5000 UNITS: 5000 INJECTION, SOLUTION INTRAVENOUS; SUBCUTANEOUS at 22:03

## 2017-08-11 RX ADMIN — CARBAMAZEPINE 200 MG: 200 TABLET ORAL at 09:04

## 2017-08-11 RX ADMIN — SODIUM CHLORIDE, SODIUM LACTATE, POTASSIUM CHLORIDE, AND CALCIUM CHLORIDE 125 ML/HR: 600; 310; 30; 20 INJECTION, SOLUTION INTRAVENOUS at 14:09

## 2017-08-11 RX ADMIN — CARVEDILOL 6.25 MG: 6.25 TABLET, FILM COATED ORAL at 09:04

## 2017-08-11 RX ADMIN — GABAPENTIN 300 MG: 300 CAPSULE ORAL at 22:03

## 2017-08-11 NOTE — PROGRESS NOTES
Physical Therapy Screening:  Services are not indicated at this time. An InBasket screening referral was triggered for physical therapy based on results obtained during the nursing admission assessment. The patients chart was reviewed and the patient is not appropriate for a skilled therapy evaluation at this time. Please consult physical therapy if any therapy needs arise. Thank you.     Lesly Jones, PTA

## 2017-08-11 NOTE — PROGRESS NOTES
Robinson   Discharge Planning/ Assessment    Reasons for Intervention: Interviewed patient, he agrees to share his discharge information with his brother,  see below. He was independent with the use of a cane prior to admission and see Dr Carmelina Massey for his primary care needs. Patient has seizure disorder His discharge plan is to return home.      High Risk Criteria  [x] Yes  []No   Physician Referral  [] Yes  [x]No        Date    Nursing Referral  [] Yes  [x]No        Date    Patient/Family Request  [] Yes  [x]No        Date       Resources:    Medicare  [x] Yes  []No   Medicaid  [x] Yes  []No   No Resources  [] Yes  [x]No   Private Insurance  [x] Yes  [x]No     Name/Phone Number    Other  [] Yes  [x]No        (i.e. Workman's Comp)         Prior Services:    Prior Services  [] Yes  [x]No   Home Health  [] Yes  [x]No   [address] [employer]  [] Yes  [x]No        [facility] of [address] Program  [] Yes  [x]No       Meals on Wheels  [] Yes  [x]No   Office on Aging  [] Yes  [x]No   Transportation Services  [] Yes  [x]No   Nursing Home  [] Yes  [x]No        Nursing Home Name    [address]  [] Yes  [x]No        [address] Name    Other       Information Source:      Information obtained from  [x] Patient  [] Parent   [] [address]  [] Child  [] Spouse   [] Significant Other/Partner   [] Friend      [] EMS    [] Nursing Home Chart          [x] Other: chart   Chart Review  [x] Yes  []No     Family/Support System:    Patient lives with  [x] Alone    [x] Spouse   [] Significant Other  [] Children  [] Caretaker   [] Parent  [] Sibling     [] Other       Other Support System:    Is the patient responsible for care of others  [] Yes  [x]No   Information of person caring for patient on  discharge self   Managers financial affairs independently  [x] Yes  []No   If no, explain:      Status Prior to Admission:    Mental Status  [x] Awake  [x] Alert  [x] Oriented  [] Quiet/Calm [] Lethargic/Sedated   [] Disoriented  [] Restless/Anxious  [] Combative   Personal Care  [] Dependent  [x] Independent Personal Care  [] Requires Assistance   Meal Preparation Ability  [x] Independent   [] Standby Assistance   [] Minimal Assistance   [] Moderate Assistance  [] Maximum Assistance     [] Total Assistance   Chores  [x] Independent with Chores   [] N/A Nursing Home Resident   [] Requires Assistance   Bowel/Bladder  [x] Continent  [] Catheter  [] Incontinent  [] Ostomy Self-Care    [] Urine Diversion Self-Care  [] Maximum Assistance     [] Total Assistance   Number of Persons needed for assistance    DME at home  [] Sb Hickman  [] Terrell Hickman   [] Commode    [] Bathroom/Grab Bars  [] Hospital Bed  [] Nebulizer  [] Oxygen           [] Raised Toilet Seat  [] Shower Chair  [] Side Rails for Bed   [] Tub Transfer Bench   [] Amado Masker  [] Consuelo Currie      [] Other:   Vendor      Treatment Presently Receiving:    Current Treatments  [] Chemotherapy  [] Dialysis  [] Insulin  [] IVAB [x] IVF   [] O2  [] PCA   [] PT   [] RT   [] Tube Feedings   [] Wound Care     Psychosocial Evaluation:    Verbalized Knowledge of Disease Process  [x] Patient  [x]Family   Coping with Disease Process  [x] Patient  [x]Family   Requires Further Counseling Coping with Disease Process  [] Patient  []Family     Identified Projected Needs:    Home Health Aid  [] Yes  [x]No   Transportation  [] Yes  [x]No   Education  [] Yes  [x]No        Specific Education     Financial Counseling  [] Yes  [x]No   Inability to Care for Self/Will Require 24 hour care  [] Yes  [x]No   Pain Management  [] Yes  [x]No   Home Infusion Therapy  [] Yes  [x]No   Oxygen Therapy  [] Yes  [x]No   DME  [] Yes  [x]No   Long Term Care Placement  [] Yes  [x]No   Rehab  [] Yes  [x]No   Physical Therapy  [] Yes  [x]No   Needs Anticipated At This Time  [] Yes  [x]No     Intra-Hospital Referral:    [facility]  [] Yes  [x]No    [] Yes  [x]No   Patient Representative  [] Yes  [x]No   Staff for Teaching Needs  [] Yes  [x]No   Specialty Teaching Needs     Diabetic Educator  [] Yes  [x]No   Referral for Diabetic Educator Needed  [] Yes  [x]No  If Yes, place order for Nutritionist or Diabetic Consult     Tentative Discharge Plan:    Home with No Services  [x] Yes  []No   Home with [address]  [] Yes  [x]No        If Yes, specify type    Home Care Program  [] Yes  [x]No        If Yes, specify type    Meals on Wheels  [] Yes  [x]No   Office of Aging  [] Yes  [x]No   NHP  [] Yes  [x]No   Return to the Nursing Home  [] Yes  [x]No   Rehab Therapy  [] Yes  [x]No   Acute Rehab  [] Yes  [x]No   Subacute Rehab  [] Yes  [x]No   Private Care  [] Yes  [x]No   Substance Abuse Referral  [] Yes  [x]No   Transportation  [] Yes  [x]No   Chore Service  [] Yes  [x]No   Inpatient Hospice  [] Yes  [x]No   OP RT  [] Yes  [x] No   OP Hemo  [] Yes  [x] No   OP PT  [] Yes  [x]No   Support Group  [] Yes  [x]No   Reach to Recovery  [] Yes  [x]No   OP Oncology Clinic  [] Yes  [x]No   Clinic Appointment  [] Yes  [x]No   DME  [] Yes  [x]No   Comments    Name of D/C Planner or  Given to Patient or Family Isadora Garcia RN   Phone Number [phone] extension [phone]   Date Aug 11, 2017   Time 743 am   If you are discharged home, whom do you designate to participate in your discharge plan and receive any information needed?      Enter name of Radha dover        Phone # of ilya         Address of [facility]         Updated Aug 11, 2017        Patient refused to designate any           individual

## 2017-08-11 NOTE — PROGRESS NOTES
Patient has designated his brother to participate in his/her discharge plan and to receive any needed information.      Name:   Andrew Cortez  brother   [phone]        Address:  Phone number:

## 2017-08-11 NOTE — PROGRESS NOTES
Problem: Falls - Risk of  Goal: *Absence of Falls  Document Ama Fall Risk and appropriate interventions in the flowsheet.    Outcome: Progressing Towards Goal  Fall Risk Interventions:  Mobility Interventions: Communicate number of staff needed for ambulation/transfer           Medication Interventions: Patient to call before getting OOB     Elimination Interventions: Patient to call for help with toileting needs

## 2017-08-11 NOTE — PROGRESS NOTES
conducted an initial consultation and Spiritual Assessment for Emmanuel Lind, who is a 55 y. o.,male. Patients Primary Language is: [place]. According to the patients EMR [place] Affiliation is: No preference. The reason the Patient came to the hospital is:   Patient Active Problem List    Diagnosis Date Noted    Renal mass 08/10/2017    Right renal mass 06/27/2017    Essential hypertension 04/06/2017    ADD (attention deficit disorder) 03/06/2017    Chronic left-sided back pain 03/06/2017    DDD (degenerative disc disease), thoracolumbar 03/06/2017    H/O traumatic brain injury 03/06/2017    High risk medications (not anticoagulants) long-term use 03/06/2017    Paroxysmal atrial fibrillation ([facility] 03/06/2017    Primary insomnia 03/06/2017    [facility] 03/06/2017        The  provided the following Interventions:  Initiated a relationship of care and support. Explored issues of claudia, spirituality and/or Restorationism needs while hospitalized. Listened empathically. Provided chaplaincy education. Provided information about Spiritual Care Services. Offered prayer and assurance of continued prayers on patient's behalf. Chart reviewed. The following outcomes were achieved:  Patient shared some information about their medical narrative and spiritual journey/beliefs. Patient processed feeling about current hospitalization. Patient expressed gratitude for the 's visit. Assessment:  Patient did not indicate any spiritual or Restorationism issues which require Spiritual Care Services interventions at this time. Patient does not have any Restorationism/cultural needs that will affect patients preferences in health care. Plan:  Chaplains will continue to follow and will provide pastoral care on an as needed or requested basis.  recommends bedside caregivers page  on duty if patient shows signs of acute spiritual or emotional distress.      Brooke Tilley   Staff [address] [facility]   [phone]

## 2017-08-11 NOTE — PROGRESS NOTES
Problem: Falls - Risk of  Goal: *Absence of Falls  Document Ama Fall Risk and appropriate interventions in the flowsheet.    Outcome: Progressing Towards Goal  Fall Risk Interventions:  Mobility Interventions: Communicate number of staff needed for ambulation/transfer, Patient to call before getting OOB, Utilize walker, cane, or other assitive device           Medication Interventions: Assess postural VS orthostatic hypotension, Patient to call before getting OOB, Teach patient to arise slowly     Elimination Interventions: Call light in reach, Elevated toilet seat, Toilet paper/wipes in reach, Toileting schedule/hourly rounds, Patient to call for help with toileting needs

## 2017-08-11 NOTE — PROGRESS NOTES
Patient has designated his brother to participate in his/her discharge plan and to receive any needed information.      Name:   Luis Enrique Beckham  brother   [phone]        Address:  Phone number:

## 2017-08-11 NOTE — PROGRESS NOTES
Urology Progress Note        Assessment/Plan:     Patient Active Problem List   Diagnosis Code    Right renal mass N28.89    ADD (attention deficit disorder) F98.8    Chronic left-sided back pain M54.9, G89.29    DDD (degenerative disc disease), thoracolumbar M51.35    Essential hypertension I10    H/O traumatic brain injury Z87.820    High risk medications (not anticoagulants) long-term use Z79.899    Paroxysmal atrial fibrillation (HCC) I48.0    Primary insomnia F51.01    Seizures (HCC) R56.9    Renal mass N28.89     POD 1 s/p R HA Lap Nx    Plan: Chinchilla out today  Advance to regular diet  Once tolerating reg diet plan to switch to Oral pain medication  UOOB  Home once pain better controlled    Houston Alexander MD      Subjective:     Daily Progress Note: 2017 7:57 AM    Resting comfortably in bed listening to music. States pain is not well controlled, using PCA. No nausea, tolerating clears. Objective:     Visit Vitals    /82 (BP 1 Location: Left arm, BP Patient Position: At rest)    Pulse 82    Temp 98.3 °F (36.8 °C)    Resp 17    Ht 5' 9\" (1.753 m)    Wt 199 lb 8 oz (90.5 kg)    SpO2 95%    BMI 29.46 kg/m2        Temp (24hrs), Av.8 °F (36.6 °C), Min:97 °F (36.1 °C), Max:98.4 °F (36.9 °C)      Intake and Output:   1901 -  0700  In: 2740.8 [P.O.:120; I.V.:2620.8]  Out: 300 [Urine:300]       PHYSICAL EXAMINATION:   Visit Vitals    /82 (BP 1 Location: Left arm, BP Patient Position: At rest)    Pulse 82    Temp 98.3 °F (36.8 °C)    Resp 17    Ht 5' 9\" (1.753 m)    Wt 199 lb 8 oz (90.5 kg)    SpO2 95%    BMI 29.46 kg/m2     Constitutional: Well developed, well nourished. No acute distress. HEENT: Normocephalic, Atraumatic, EOM's intact   CV:  Radial pulse is intact bilaterally.  No peripheral swelling noted   Respiratory: No respiratory distress or difficulties breathing   Abdomen:  Soft, non-tender, non-distended   Male:   No cvat  Skin: No evidence of jaundice. Normal color  Neuro/Psych:  Alert and oriented. Affect appropriate. Lymphatic:   No enlarged inguinal lymph nodes. Incision: clean    Lab/Data Review: All lab results for the last 24 hours reviewed. Labs:     Labs: Results:   Chemistry    Recent Labs      08/11/17   0600   GLU  122*   NA  137   K  4.2   CL  103   CO2  27   BUN  16   CREA  1.24   CA  8.3*   AGAP  7   BUCR  13      CBC w/Diff Recent Labs      08/11/17   0600   WBC  5.9   RBC  3.86*   HGB  12.3*   HCT  37.5   PLT  142      Cultures No results for input(s): CULT in the last 72 hours. All Micro Results     None            Urinalysis Potassium   Date Value Ref Range Status   08/11/2017 4.2 3.5 - 5.5 mmol/L Final     Creatinine   Date Value Ref Range Status   08/11/2017 1.24 0.6 - 1.3 MG/DL Final     BUN   Date Value Ref Range Status   08/11/2017 16 7.0 - 18 MG/DL Final      PSA No results for input(s): PSA in the last 72 hours.    Coagulation No results found for: PTP, INR, APTT

## 2017-08-11 NOTE — PROGRESS NOTES
5914 assessment complete,patient  In bed eating and watching tv,call bell within reach,  No acute distress noted, meds given. Removed O2 patient remain between 93-94% RA    1711 patient in chair, call bell within reach , no acute distress noted     1834 patient c/o nausea,zofran given, patient sitting in chair watching tv , no acute distress noted     1850 patient reported nausea resolved, diet remained clear liquid due to patient not passing flatus however patient is belching. 1930 Bedside and Verbal shift change report given to Brendon Alfaro (oncoming nurse) by Beth Snider RN   (offgoing nurse). Report included the following information SBAR, Kardex and MAR.

## 2017-08-11 NOTE — PROGRESS NOTES
Tyrell Guadalupe provider rounded on [address] to provide education related to sleep apnea after chart review for risk factors. Risk factors include:  1. Hypertension  2. Atrial Fibrillation  3. Cancer  4. Seizures  5. Mallampati II  6. STOP BANG score 4  7. Kite Sleepiness score 5    Provided patient with the following pamphlets:  1. What is Sleep Apnea  2. Sleep and Medical problems  3. Sleep & Your Heart  4. Common Sleep Problems for Older Adults  5. Tips For Sleep As You Age  10. Tips For Better Sleep In Older Adults    Patient Education:  1. Reviewed sleep hygiene & effects of poor sleep quality. 2. Reviewed relationship between sleep & heart health. 3. Reviewed relationship between sleep & age. 4. Reviewed relationship between sleep & weight. Patient stated that he had a prior sleep study in [place] about 2 years ago. The results were negative according to the patient. Recommendations:  1. Referral to sleep specialist for evaluation if symptoms of poor sleep quality present. 2. Order baseline PSG testing to be arranged as an outpatient. 3. Follow up with sleep specialist for treatment and management.

## 2017-08-11 NOTE — ROUTINE PROCESS
Bedside and Verbal shift change report given to José Miguel (oncoming nurse) by Duyen Beth RN (offgoing nurse). Report included the following information SBAR, Kardex, Intake/Output and MAR.

## 2017-08-12 PROCEDURE — 74011250636 HC RX REV CODE- 250/636: Performed by: UROLOGY

## 2017-08-12 PROCEDURE — 77030020255 HC SOL INJ LR 1000ML BG

## 2017-08-12 PROCEDURE — 74011250637 HC RX REV CODE- 250/637: Performed by: UROLOGY

## 2017-08-12 PROCEDURE — 65270000029 HC RM PRIVATE

## 2017-08-12 RX ORDER — HYDROCODONE BITARTRATE AND ACETAMINOPHEN 5; 325 MG/1; MG/1
2 TABLET ORAL
Status: DISCONTINUED | OUTPATIENT
Start: 2017-08-12 | End: 2017-08-12

## 2017-08-12 RX ORDER — FACIAL-BODY WIPES
10 EACH TOPICAL DAILY
Status: DISCONTINUED | OUTPATIENT
Start: 2017-08-12 | End: 2017-08-13 | Stop reason: HOSPADM

## 2017-08-12 RX ORDER — DIAZEPAM 2 MG/1
2 TABLET ORAL ONCE
Status: COMPLETED | OUTPATIENT
Start: 2017-08-12 | End: 2017-08-12

## 2017-08-12 RX ORDER — HYDROMORPHONE HYDROCHLORIDE 2 MG/1
2 TABLET ORAL
Status: DISCONTINUED | OUTPATIENT
Start: 2017-08-12 | End: 2017-08-12

## 2017-08-12 RX ORDER — HYDROCODONE BITARTRATE AND ACETAMINOPHEN 5; 325 MG/1; MG/1
1-2 TABLET ORAL
Status: DISCONTINUED | OUTPATIENT
Start: 2017-08-12 | End: 2017-08-13 | Stop reason: HOSPADM

## 2017-08-12 RX ADMIN — CARVEDILOL 6.25 MG: 6.25 TABLET, FILM COATED ORAL at 10:51

## 2017-08-12 RX ADMIN — CARBAMAZEPINE 200 MG: 200 TABLET ORAL at 16:29

## 2017-08-12 RX ADMIN — GABAPENTIN 300 MG: 300 CAPSULE ORAL at 16:29

## 2017-08-12 RX ADMIN — HYDROMORPHONE HYDROCHLORIDE 2 MG: 2 TABLET ORAL at 12:11

## 2017-08-12 RX ADMIN — SODIUM CHLORIDE, SODIUM LACTATE, POTASSIUM CHLORIDE, AND CALCIUM CHLORIDE 125 ML/HR: 600; 310; 30; 20 INJECTION, SOLUTION INTRAVENOUS at 05:51

## 2017-08-12 RX ADMIN — ACETAMINOPHEN 650 MG: 325 TABLET, FILM COATED ORAL at 07:18

## 2017-08-12 RX ADMIN — DIAZEPAM 10 MG: 5 TABLET ORAL at 16:36

## 2017-08-12 RX ADMIN — SODIUM CHLORIDE, SODIUM LACTATE, POTASSIUM CHLORIDE, AND CALCIUM CHLORIDE 75 ML/HR: 600; 310; 30; 20 INJECTION, SOLUTION INTRAVENOUS at 16:28

## 2017-08-12 RX ADMIN — HYDROCODONE BITARTRATE AND ACETAMINOPHEN 2 TABLET: 5; 325 TABLET ORAL at 22:19

## 2017-08-12 RX ADMIN — DIAZEPAM 2 MG: 2 TABLET ORAL at 22:20

## 2017-08-12 RX ADMIN — CARVEDILOL 6.25 MG: 6.25 TABLET, FILM COATED ORAL at 20:37

## 2017-08-12 RX ADMIN — HEPARIN SODIUM 5000 UNITS: 5000 INJECTION, SOLUTION INTRAVENOUS; SUBCUTANEOUS at 22:22

## 2017-08-12 RX ADMIN — Medication: at 01:56

## 2017-08-12 RX ADMIN — HEPARIN SODIUM 5000 UNITS: 5000 INJECTION, SOLUTION INTRAVENOUS; SUBCUTANEOUS at 16:29

## 2017-08-12 RX ADMIN — LISINOPRIL 40 MG: 40 TABLET ORAL at 10:51

## 2017-08-12 RX ADMIN — GABAPENTIN 300 MG: 300 CAPSULE ORAL at 10:51

## 2017-08-12 RX ADMIN — DEXTROAMPHETAMINE SACCHARATE, AMPHETAMINE ASPARTATE, DEXTROAMPHETAMINE SULFATE AND AMPHETAMINE SULFATE 15 MG: 1.25; 1.25; 1.25; 1.25 TABLET ORAL at 12:11

## 2017-08-12 RX ADMIN — ACETAMINOPHEN 650 MG: 325 TABLET, FILM COATED ORAL at 00:52

## 2017-08-12 RX ADMIN — DEXTROAMPHETAMINE SACCHARATE, AMPHETAMINE ASPARTATE, DEXTROAMPHETAMINE SULFATE AND AMPHETAMINE SULFATE 15 MG: 1.25; 1.25; 1.25; 1.25 TABLET ORAL at 16:29

## 2017-08-12 RX ADMIN — HYDROCODONE BITARTRATE AND ACETAMINOPHEN 2 TABLET: 5; 325 TABLET ORAL at 16:29

## 2017-08-12 RX ADMIN — HEPARIN SODIUM 5000 UNITS: 5000 INJECTION, SOLUTION INTRAVENOUS; SUBCUTANEOUS at 05:52

## 2017-08-12 RX ADMIN — CARBAMAZEPINE 200 MG: 200 TABLET ORAL at 10:51

## 2017-08-12 NOTE — PROGRESS NOTES
Urology Progress Note        Assessment/Plan:     Patient Active Problem List   Diagnosis Code    Right renal mass N28.89    ADD (attention deficit disorder) F98.8    Chronic left-sided back pain M54.9, G89.29    DDD (degenerative disc disease), thoracolumbar M51.35    Essential hypertension I10    H/O traumatic brain injury Z87.820    High risk medications (not anticoagulants) long-term use Z79.899    Paroxysmal atrial fibrillation (HCC) I48.0    Primary insomnia F51.01    Seizures (HCC) R56.9    Renal mass N28.89     POD 2 s/p R HA Lap Nx    Plan: Chinchilla out today  Advance to regular diet  DC pca  IVF 75cc  UOOB  Labs in am  UOOB  Warm compress to right hand  Home once pain better controlled    Heike Vieira MD      Subjective:     Daily Progress Note: 2017 7:57 AM    Resting comfortably in bed listening to music. States pain is not well controlled, using PCA. No nausea, tolerating clears. No gas, Right hand swollen after infiltrated IV    Objective:     Visit Vitals    /81    Pulse 86    Temp 99 °F (37.2 °C)    Resp 18    Ht 5' 9\" (1.753 m)    Wt 199 lb 8 oz (90.5 kg)    SpO2 90%    BMI 29.46 kg/m2        Temp (24hrs), Av.5 °F (36.9 °C), Min:98.1 °F (36.7 °C), Max:99 °F (37.2 °C)      Intake and Output:  08/10 1901 -  0700  In: 5359.2 [P.O.:680; I.V.:4679.2]  Out: 4300 [Urine:4300]       PHYSICAL EXAMINATION:   Visit Vitals    /81    Pulse 86    Temp 99 °F (37.2 °C)    Resp 18    Ht 5' 9\" (1.753 m)    Wt 199 lb 8 oz (90.5 kg)    SpO2 90%    BMI 29.46 kg/m2     Constitutional: Well developed, well nourished. No acute distress. HEENT: Normocephalic, Atraumatic, EOM's intact   CV:  Radial pulse is intact bilaterally. No peripheral swelling noted   Respiratory: No respiratory distress or difficulties breathing   Abdomen:  Soft, non-tender, non-distended   Male:   No cvat  Skin: No evidence of jaundice.   Normal color  Neuro/Psych:  Alert and oriented. Affect appropriate. Lymphatic:   No enlarged inguinal lymph nodes. Incision: clean    Lab/Data Review: All lab results for the last 24 hours reviewed. Labs:     Labs: Results:   Chemistry    Recent Labs      08/11/17   0600   GLU  122*   NA  137   K  4.2   CL  103   CO2  27   BUN  16   CREA  1.24   CA  8.3*   AGAP  7   BUCR  13      CBC w/Diff Recent Labs      08/11/17   0600   WBC  5.9   RBC  3.86*   HGB  12.3*   HCT  37.5   PLT  142      Cultures No results for input(s): CULT in the last 72 hours. All Micro Results     None            Urinalysis Potassium   Date Value Ref Range Status   08/11/2017 4.2 3.5 - 5.5 mmol/L Final     Creatinine   Date Value Ref Range Status   08/11/2017 1.24 0.6 - 1.3 MG/DL Final     BUN   Date Value Ref Range Status   08/11/2017 16 7.0 - 18 MG/DL Final      PSA No results for input(s): PSA in the last 72 hours.    Coagulation No results found for: PTP, INR, APTT

## 2017-08-12 NOTE — PROGRESS NOTES
Problem: Pain  Goal: *Control of Pain  Outcome: Progressing Towards Goal  Pain management. Problem: Falls - Risk of  Goal: *Absence of Falls  Document Ama Fall Risk and appropriate interventions in the flowsheet.    Outcome: Progressing Towards Goal  Fall Risk Interventions:  Mobility Interventions: Patient to call before getting OOB, Utilize walker, cane, or other assitive device     Mentation Interventions: Adequate sleep, hydration, pain control, Door open when patient unattended, Evaluate medications/consider consulting pharmacy, Increase mobility     Medication Interventions: Assess postural VS orthostatic hypotension, Evaluate medications/consider consulting pharmacy, Patient to call before getting OOB, Teach patient to arise slowly     Elimination Interventions: Call light in reach, Patient to call for help with toileting needs, Toileting schedule/hourly rounds

## 2017-08-12 NOTE — PROGRESS NOTES
Bedside and Verbal shift change report given to Evonne Nash RN (oncoming nurse) by Grecia Ca RN (offgoing nurse). Report included the following information SBAR, Kardex, OR Summary, Intake/Output and MAR.

## 2017-08-12 NOTE — PROGRESS NOTES
Problem: Falls - Risk of  Goal: *Absence of Falls  Document Ama Fall Risk and appropriate interventions in the flowsheet. Outcome: Progressing Towards Goal  Fall Risk Interventions:  Mobility Interventions: Communicate number of staff needed for ambulation/transfer, Patient to call before getting OOB, Strengthening exercises (ROM-active/passive)           Medication Interventions: Patient to call before getting OOB, Teach patient to arise slowly     Elimination Interventions: Call light in reach, Patient to call for help with toileting needs, Toileting schedule/hourly rounds                 Comments:   Patient will continue to call before getting oob or moving from chair to bed.   Patient acknowledges the falls risks including tubing for for IVs.

## 2017-08-12 NOTE — ROUTINE PROCESS
Bedside verbal report received from [address] [place] [address]. Assumed care of patient at 1900  2000 Patient complaining PCA not working, instructed patient on PCA use. Patient states he doesn't see the medication moving. Explained the medication is clear and he is not likely to visualize the moving. A circular discussion ensued. Patient hyperfocused that he doesn't see medication moving. This nurse continuing to encourage PCA use.    6488 noted patient IV infiltrated, removed. Encouraged elevation of limb.  0010 patient refused to get oob to chair. States he will ambulate when doctor arrives. Bedside and Verbal shift change report given to Sonny Arthur (oncoming nurse) by Ricky Julian RN   (offgoing nurse). Report included the following information SBAR and Kardex.

## 2017-08-13 VITALS
TEMPERATURE: 98 F | BODY MASS INDEX: 29.55 KG/M2 | WEIGHT: 199.5 LBS | OXYGEN SATURATION: 93 % | SYSTOLIC BLOOD PRESSURE: 136 MMHG | DIASTOLIC BLOOD PRESSURE: 62 MMHG | HEART RATE: 77 BPM | HEIGHT: 69 IN | RESPIRATION RATE: 18 BRPM

## 2017-08-13 LAB
ANION GAP BLD CALC-SCNC: 11 MMOL/L (ref 3–18)
BUN SERPL-MCNC: 13 MG/DL (ref 7–18)
BUN/CREAT SERPL: 11 (ref 12–20)
CALCIUM SERPL-MCNC: 8.4 MG/DL (ref 8.5–10.1)
CHLORIDE SERPL-SCNC: 99 MMOL/L (ref 100–108)
CO2 SERPL-SCNC: 25 MMOL/L (ref 21–32)
CREAT SERPL-MCNC: 1.16 MG/DL (ref 0.6–1.3)
ERYTHROCYTE [DISTWIDTH] IN BLOOD BY AUTOMATED COUNT: 12.3 % (ref 11.6–14.5)
GLUCOSE SERPL-MCNC: 87 MG/DL (ref 74–99)
HCT VFR BLD AUTO: 34.5 % (ref 36–48)
HGB BLD-MCNC: 11.7 G/DL (ref 13–16)
MCH RBC QN AUTO: 31.5 PG (ref 24–34)
MCHC RBC AUTO-ENTMCNC: 33.9 G/DL (ref 31–37)
MCV RBC AUTO: 93 FL (ref 74–97)
PLATELET # BLD AUTO: 138 K/UL (ref 135–420)
PMV BLD AUTO: 10.9 FL (ref 9.2–11.8)
POTASSIUM SERPL-SCNC: 4 MMOL/L (ref 3.5–5.5)
RBC # BLD AUTO: 3.71 M/UL (ref 4.7–5.5)
SODIUM SERPL-SCNC: 135 MMOL/L (ref 136–145)
WBC # BLD AUTO: 5 K/UL (ref 4.6–13.2)

## 2017-08-13 PROCEDURE — 80048 BASIC METABOLIC PNL TOTAL CA: CPT | Performed by: UROLOGY

## 2017-08-13 PROCEDURE — 74011250637 HC RX REV CODE- 250/637: Performed by: UROLOGY

## 2017-08-13 PROCEDURE — 74011250636 HC RX REV CODE- 250/636: Performed by: UROLOGY

## 2017-08-13 PROCEDURE — 36415 COLL VENOUS BLD VENIPUNCTURE: CPT | Performed by: UROLOGY

## 2017-08-13 PROCEDURE — 85027 COMPLETE CBC AUTOMATED: CPT | Performed by: UROLOGY

## 2017-08-13 RX ORDER — OXYCODONE AND ACETAMINOPHEN 10; 325 MG/1; MG/1
2 TABLET ORAL ONCE
Status: DISCONTINUED | OUTPATIENT
Start: 2017-08-13 | End: 2017-08-13 | Stop reason: HOSPADM

## 2017-08-13 RX ADMIN — HEPARIN SODIUM 5000 UNITS: 5000 INJECTION, SOLUTION INTRAVENOUS; SUBCUTANEOUS at 06:37

## 2017-08-13 RX ADMIN — ATROPA BELLADONNA AND OPIUM 1 SUPPOSITORY: 16.2; 3 SUPPOSITORY RECTAL at 06:37

## 2017-08-13 RX ADMIN — HYDROCODONE BITARTRATE AND ACETAMINOPHEN 2 TABLET: 5; 325 TABLET ORAL at 06:36

## 2017-08-13 RX ADMIN — HYDROCODONE BITARTRATE AND ACETAMINOPHEN 2 TABLET: 5; 325 TABLET ORAL at 02:29

## 2017-08-13 NOTE — PROGRESS NOTES
Problem: Falls - Risk of  Goal: *Absence of Falls  Document Ama Fall Risk and appropriate interventions in the flowsheet.    Outcome: Progressing Towards Goal  Fall Risk Interventions:  Mobility Interventions: Patient to call before getting OOB     Mentation Interventions: Door open when patient unattended, Toileting rounds     Medication Interventions: Patient to call before getting OOB     Elimination Interventions: Call light in reach, Patient to call for help with toileting needs

## 2017-08-13 NOTE — PROGRESS NOTES
1700 - received report on pt from Caden Mccrary RN for continuity of care. Pt resting in bed. Rates pain 10/10 to abdomen. Pt was advised of his next medication time per off going nurse. 1830 - pt resting in bed, watching TV    1852 - pt c/o not receiving enough pain medication, advised that he is on pain management and that the doctor knew this. I advised when his next medication is due. Pt got upset and stated \"this is #$@, I have my medications in my car, I'm not getting enough and this medication is not touching my pain. \" Pt does not appear in distress, just upset about not getting his medication, laying in bed, watching TV. Will continue to monitor.

## 2017-08-13 NOTE — ROUTINE PROCESS
Assumed care of patient at 2035 report received from Chanel RG RN. Received pt in bed complaints of back pain, pain reports pain rate of 10. He is upset about not taking care of his pain. This Nurse reviewed patient prn medications. 2130 - Explained patient Norco every 6 hours prn and Valium tab every 8 hours prn. Patient got agitated and upset. He said \" I am leaving out of here now\". Explained to patient the consequences of leaving out of the hospital this time of night, Patient would still wanted to leave AMA. 2209 - Paged and Notified Dr. Helga Lucia, orders received for Valium 2 mg po X 1 now and Norco 1-2 tabs every 4 hours prn. See MAR.  2300 - Patient in bed resting and seems comfortable now. 8/13/17  0110 - Patient in bed sleeping. Unlabored respiration. [address] IV is out and he said \" I don't know how it came out\". Pt refused to reinsert new PIV.  1406 - Notified Dr. Helga Lucia about his PIV came out. Dr. Helga Lucia said it's okay leave it out. 0800 - Bedside and Verbal shift change report given to Elida Chamorro, MARK (oncoming nurse) by Jerrod Brown RN BSN (offgoing nurse). Report given with SBAR, Kardex, Intake/Output, MAR and Recent Results.

## 2017-08-13 NOTE — DISCHARGE SUMMARY
Discharge Summary    Patient: Herminia Fong               Sex: male          DOA: 8/10/2017         YOB: 1960      Age:  55 y.o.        LOS:  LOS: 3 days                Admit Date: 8/10/2017    Discharge Date: 8/13/2017    Admission Diagnoses: RIGHT RENAL MASS N28.89; Renal mass    Discharge Diagnoses:    Problem List as of 8/13/2017  Date Reviewed: 8/10/2017          Codes Class Noted - Resolved    Renal mass ICD-10-CM: N28.89  ICD-9-CM: 593.9  8/10/2017 - Present        Right renal mass ICD-10-CM: N28.89  ICD-9-CM: 593.9  6/27/2017 - Present        Essential hypertension ICD-10-CM: I10  ICD-9-CM: 401.9  4/6/2017 - Present        ADD (attention deficit disorder) ICD-10-CM: F98.8  ICD-9-CM: 314.00  3/6/2017 - Present        Chronic left-sided back pain ICD-10-CM: M54.9, G89.29  ICD-9-CM: 724.5, 338.29  3/6/2017 - Present        DDD (degenerative disc disease), thoracolumbar ICD-10-CM: M51.35  ICD-9-CM: 722.51  3/6/2017 - Present        H/O traumatic brain injury ICD-10-CM: Z87.820  ICD-9-CM: V15.52  3/6/2017 - Present        High risk medications (not anticoagulants) long-term use ICD-10-CM: Z79.899  ICD-9-CM: V58.69  3/6/2017 - Present        Paroxysmal atrial fibrillation (HCC) ICD-10-CM: I48.0  ICD-9-CM: 427.31  3/6/2017 - Present        Primary insomnia ICD-10-CM: F51.01  ICD-9-CM: 307.42  3/6/2017 - Present        Seizures ([address] ICD-10-CM: R56.9  ICD-9-CM: 780.39  3/6/2017 - Present              Discharge Condition: Stable    Hospital Course: Unremarkable: See chart for further detials       Progress Note    Patient: Herminia Fong MRN: [id number]  SSN: [id number]    YOB: 1960  Age: 55 y.o.   Sex: male      Admit Date: 8/10/2017    LOS: 3 days     Subjective:     Con't with some pain, eating drinking having bm    Objective:     Vitals:    08/12/17 1810 08/12/17 2010 08/13/17 0018 08/13/17 0420   BP:  155/84 143/79 136/62   Pulse: 77 83 77 77   Resp: 18 18 18 18   Temp: 98.8 °F (37.1 °C) 99.3 °F (37.4 °C) 98.2 °F (36.8 °C) 98 °F (36.7 °C)   SpO2: 95% 90% 93% 93%   Weight:       Height:            Intake and Output:  Current Shift:    Last three shifts: 08/11 1901 - 08/13 0700  In: 4092.5 [P.O.:240; I.V.:3852.5]  Out: 5700 [Urine:5700]    Physical Exam:   GENERAL: alert, cooperative, no distress, appears stated age  LUNG: unlabored  HEART: regular rate and rhythm  ABDOMEN: Soft, Non tender  EXTREMITIES:  extremities normal, atraumatic, no cyanosis or edema  SKIN: Normal.  PSYCHIATRIC: non focal  INCISION: clean, dry, intact    Lab/Data Review:    Lab Results   Component Value Date/Time    WBC 5.0 08/13/2017 04:25 AM    HGB 11.7 08/13/2017 04:25 AM    HCT 34.5 08/13/2017 04:25 AM    PLATELET 864 66/91/6308 04:25 AM    MCV 93.0 08/13/2017 04:25 AM       Lab Results   Component Value Date/Time    Sodium 135 08/13/2017 04:25 AM    Potassium 4.0 08/13/2017 04:25 AM    Chloride 99 08/13/2017 04:25 AM    CO2 25 08/13/2017 04:25 AM    Anion gap 11 08/13/2017 04:25 AM    Glucose 87 08/13/2017 04:25 AM    BUN 13 08/13/2017 04:25 AM    Creatinine 1.16 08/13/2017 04:25 AM    BUN/Creatinine ratio 11 08/13/2017 04:25 AM    GFR est AA >60 08/13/2017 04:25 AM    GFR est non-AA >60 08/13/2017 04:25 AM    Calcium 8.4 08/13/2017 04:25 AM         Pod 3 s/p HARDY Nx     Assessment:     Active Problems:    Renal mass (8/10/2017)        Plan:     Plan for home today  F/u for path review 9/8/17 at 10:15  May shower, no tub bath x 2 weeks    No heavy lifting >15 lbs for 4 weeks. No driving on narcotics. OK to shower 48 hours after surgery. No baths or submerging incisions for 4 weeks until incisions are completely healed. Call MD if fever >101.5, signs of infection, intractable pain, nausea, vomiting, significant bleeding, worsening shortness of breath or chest pain, painful leg swelling, or any questions or concerns.           Signed By: Jt Rodriguez MD     August 13, 2017              Consults: None    Significant Diagnostic Studies: None    Discharge Medications:     Current Discharge Medication List      START taking these medications    Details   oxyCODONE-acetaminophen (PERCOCET) 5-325 mg per tablet Take 1 Tab by mouth as needed. Qty: 30 Tab, Refills: 0      docusate sodium (COLACE) 100 mg capsule Take 1 Cap by mouth two (2) times daily as needed for Constipation for up to 90 days. Qty: 60 Cap, Refills: 2         CONTINUE these medications which have NOT CHANGED    Details   diazePAM (VALIUM) 10 mg tablet TK 1 AND 1/2 T PO QHS PRN  Refills: 1      gabapentin (NEURONTIN) 300 mg capsule TK 1 C PO TID  Refills: 2      cyclobenzaprine (FLEXERIL) 10 mg tablet TK 1 T PO TID  Refills: 1      tapentadol (NUCYNTA) 50 mg tablet Take 1 Tab by Mouth 3 Times Daily As Needed. dextroamphetamine-amphetamine (ADDERALL) 10 mg tablet Take 15 mg by mouth three (3) times daily. albuterol (VENTOLIN HFA) 90 mcg/actuation inhaler Take 2 Puffs by inhalation every four (4) hours as needed for Wheezing. CARBAMAZEPINE (CARBATROL PO) Take 200 mg by mouth three (3) times daily. CARVEDILOL PO Take 6.25 mg by mouth two (2) times a day. LISINOPRIL PO Take 40 mg by mouth daily. APIXABAN (ELIQUIS PO) Take 5 mg by mouth two (2) times a day. multivitamin (ONE A DAY) tablet Take 1 Tab by mouth daily. ibuprofen 100 mg tablet Take 800 mg by mouth daily. Activity: As tolerated    Diet: Regular    Wound Care:  May shower    Follow-up: As scheduled

## 2017-08-14 NOTE — PROGRESS NOTES
8818 - pt heard in hallway after being asked by evening nurse \"where are you going\"? Pt stated, I'm leaving, I can't get the medication I need, I'm sick of this, I called a friend to come pick me up. I advised pt that for his safety, we are unable to let him leave at this time. I asked the pt to please wait in his room until I am able to contact Dr. Jacey Flannery, he stated \"no I'm not going back to my room, I'm sick of being in there and I want to know where my cane is. \" I offered the pt a chair and had him sit by the nurses station and asked him to please keep his voice down. I called Dr. Jacey Flannery and he advised me that he is in his car now heading to the hospital and to advise pt to wait on him to be discharged. 65 - Dr. Jacey Flannery arrived to hospital and spoke with patient, shortly afterwards pt's discharge order was placed. Pt's ride has not yet arrived, I asked pt to please go back to his room to wait for his ride, pt agreed. His cane was located in his room. 9512 - pt was d/c'd and escorted by nurse Sudarshan Sands RN from floor after confirming his ride was at ED entrance.

## 2017-08-15 NOTE — PROGRESS NOTES
1025 transported patient for primary nurse to emergency room entrance safely into the car with patient's friend name Jason Mahoney. Patient stated \"my iglesia is across the street with my meds in it. \" Patient left with prescription, belongings, cane, and discharge instruction packet, my friend will take me home and I gotta leave my iglesia here. \"      2019 entered patient's chart to document, HIPPA maintained.   0814/17

## 2017-10-03 ENCOUNTER — ANESTHESIA EVENT (OUTPATIENT)
Dept: SURGERY | Age: 57
DRG: 460 | End: 2017-10-03
Payer: MEDICARE

## 2017-10-04 ENCOUNTER — ANESTHESIA (OUTPATIENT)
Dept: SURGERY | Age: 57
DRG: 460 | End: 2017-10-04
Payer: MEDICARE

## 2017-10-04 ENCOUNTER — APPOINTMENT (OUTPATIENT)
Dept: GENERAL RADIOLOGY | Age: 57
DRG: 460 | End: 2017-10-04
Attending: ORTHOPAEDIC SURGERY
Payer: MEDICARE

## 2017-10-04 ENCOUNTER — HOSPITAL ENCOUNTER (INPATIENT)
Age: 57
LOS: 2 days | Discharge: HOME HEALTH | DRG: 460 | End: 2017-10-06
Attending: ORTHOPAEDIC SURGERY | Admitting: ORTHOPAEDIC SURGERY
Payer: MEDICARE

## 2017-10-04 PROBLEM — C64.9 RENAL CELL CANCER (HCC): Status: ACTIVE | Noted: 2017-10-04

## 2017-10-04 PROBLEM — M48.061 LUMBAR STENOSIS: Status: ACTIVE | Noted: 2017-10-04

## 2017-10-04 PROCEDURE — 0MBD0ZZ EXCISION OF LOWER SPINE BURSA AND LIGAMENT, OPEN APPROACH: ICD-10-PCS | Performed by: ORTHOPAEDIC SURGERY

## 2017-10-04 PROCEDURE — C1713 ANCHOR/SCREW BN/BN,TIS/BN: HCPCS | Performed by: ORTHOPAEDIC SURGERY

## 2017-10-04 PROCEDURE — 74011250637 HC RX REV CODE- 250/637: Performed by: NURSE PRACTITIONER

## 2017-10-04 PROCEDURE — 74011250637 HC RX REV CODE- 250/637: Performed by: HOSPITALIST

## 2017-10-04 PROCEDURE — C1762 CONN TISS, HUMAN(INC FASCIA): HCPCS | Performed by: ORTHOPAEDIC SURGERY

## 2017-10-04 PROCEDURE — 77030018723 HC ELCTRD BLD COVD -A: Performed by: ORTHOPAEDIC SURGERY

## 2017-10-04 PROCEDURE — 77030032490 HC SLV COMPR SCD KNE COVD -B: Performed by: ORTHOPAEDIC SURGERY

## 2017-10-04 PROCEDURE — 74011000258 HC RX REV CODE- 258: Performed by: ORTHOPAEDIC SURGERY

## 2017-10-04 PROCEDURE — 77030026188 HC BN CANC CHP CRSH PR LIFV -E: Performed by: ORTHOPAEDIC SURGERY

## 2017-10-04 PROCEDURE — 76210000017 HC OR PH I REC 1.5 TO 2 HR: Performed by: ORTHOPAEDIC SURGERY

## 2017-10-04 PROCEDURE — 01NB0ZZ RELEASE LUMBAR NERVE, OPEN APPROACH: ICD-10-PCS | Performed by: ORTHOPAEDIC SURGERY

## 2017-10-04 PROCEDURE — 74011000258 HC RX REV CODE- 258

## 2017-10-04 PROCEDURE — 74011250636 HC RX REV CODE- 250/636

## 2017-10-04 PROCEDURE — 77030018836 HC SOL IRR NACL ICUM -A: Performed by: ORTHOPAEDIC SURGERY

## 2017-10-04 PROCEDURE — 74011250636 HC RX REV CODE- 250/636: Performed by: NURSE ANESTHETIST, CERTIFIED REGISTERED

## 2017-10-04 PROCEDURE — 74011250637 HC RX REV CODE- 250/637: Performed by: NURSE ANESTHETIST, CERTIFIED REGISTERED

## 2017-10-04 PROCEDURE — 77030008477 HC STYL SATN SLP COVD -A: Performed by: ANESTHESIOLOGY

## 2017-10-04 PROCEDURE — 76060000038 HC ANESTHESIA 3.5 TO 4 HR: Performed by: ORTHOPAEDIC SURGERY

## 2017-10-04 PROCEDURE — 74011250637 HC RX REV CODE- 250/637: Performed by: ORTHOPAEDIC SURGERY

## 2017-10-04 PROCEDURE — 77030008974 HC BN CANC CHP LIFV -D: Performed by: ORTHOPAEDIC SURGERY

## 2017-10-04 PROCEDURE — 74011000272 HC RX REV CODE- 272: Performed by: ORTHOPAEDIC SURGERY

## 2017-10-04 PROCEDURE — 77030034850: Performed by: ORTHOPAEDIC SURGERY

## 2017-10-04 PROCEDURE — 0SG3071 FUSION OF LUMBOSACRAL JOINT WITH AUTOLOGOUS TISSUE SUBSTITUTE, POSTERIOR APPROACH, POSTERIOR COLUMN, OPEN APPROACH: ICD-10-PCS | Performed by: ORTHOPAEDIC SURGERY

## 2017-10-04 PROCEDURE — 77030018673: Performed by: ORTHOPAEDIC SURGERY

## 2017-10-04 PROCEDURE — 76010000174 HC OR TIME 3.5 TO 4 HR INTENSV-TIER 1: Performed by: ORTHOPAEDIC SURGERY

## 2017-10-04 PROCEDURE — 77030008683 HC TU ET CUF COVD -A: Performed by: ANESTHESIOLOGY

## 2017-10-04 PROCEDURE — 77030011640 HC PAD GRND REM COVD -A: Performed by: ORTHOPAEDIC SURGERY

## 2017-10-04 PROCEDURE — 0SG0071 FUSION OF LUMBAR VERTEBRAL JOINT WITH AUTOLOGOUS TISSUE SUBSTITUTE, POSTERIOR APPROACH, POSTERIOR COLUMN, OPEN APPROACH: ICD-10-PCS | Performed by: ORTHOPAEDIC SURGERY

## 2017-10-04 PROCEDURE — 74011000250 HC RX REV CODE- 250

## 2017-10-04 PROCEDURE — 77030031359 HC BLD BN MILL DISP STRY -E: Performed by: ORTHOPAEDIC SURGERY

## 2017-10-04 PROCEDURE — 77030027138 HC INCENT SPIROMETER -A

## 2017-10-04 PROCEDURE — 74011250636 HC RX REV CODE- 250/636: Performed by: ORTHOPAEDIC SURGERY

## 2017-10-04 PROCEDURE — 97530 THERAPEUTIC ACTIVITIES: CPT

## 2017-10-04 PROCEDURE — 77030003028 HC SUT VCRL J&J -A: Performed by: ORTHOPAEDIC SURGERY

## 2017-10-04 PROCEDURE — 01NR0ZZ RELEASE SACRAL NERVE, OPEN APPROACH: ICD-10-PCS | Performed by: ORTHOPAEDIC SURGERY

## 2017-10-04 PROCEDURE — 97162 PT EVAL MOD COMPLEX 30 MIN: CPT

## 2017-10-04 PROCEDURE — 74011000250 HC RX REV CODE- 250: Performed by: ORTHOPAEDIC SURGERY

## 2017-10-04 PROCEDURE — 65270000029 HC RM PRIVATE

## 2017-10-04 PROCEDURE — 77030013079 HC BLNKT BAIR HGGR 3M -A: Performed by: ANESTHESIOLOGY

## 2017-10-04 PROCEDURE — 77030031139 HC SUT VCRL2 J&J -A: Performed by: ORTHOPAEDIC SURGERY

## 2017-10-04 PROCEDURE — 77010033678 HC OXYGEN DAILY

## 2017-10-04 PROCEDURE — 77030004391 HC BUR FLUT MEDT -C: Performed by: ORTHOPAEDIC SURGERY

## 2017-10-04 DEVICE — PLUG SPNL DIA5.5MM STD HELI FLNG POLARIS: Type: IMPLANTABLE DEVICE | Site: SPINE LUMBAR | Status: FUNCTIONAL

## 2017-10-04 DEVICE — SCREW SPNL L50MM OD6.5MM TI ST MULTAXL CANN FULL THRD HELCL: Type: IMPLANTABLE DEVICE | Site: SPINE LUMBAR | Status: FUNCTIONAL

## 2017-10-04 DEVICE — BONE CHIP CANC CRSH 1-8MM 20ML -- PCAN1/4: Type: IMPLANTABLE DEVICE | Site: SPINE LUMBAR | Status: FUNCTIONAL

## 2017-10-04 DEVICE — SCREW SPNL L45MM OD6.5MM TI ST MULTAXL CANN FULL THRD HELCL: Type: IMPLANTABLE DEVICE | Site: SPINE LUMBAR | Status: FUNCTIONAL

## 2017-10-04 DEVICE — IMPLANTABLE DEVICE: Type: IMPLANTABLE DEVICE | Site: SPINE LUMBAR | Status: FUNCTIONAL

## 2017-10-04 DEVICE — BONE CHIP CANC CRSH 1-8MM 30ML --: Type: IMPLANTABLE DEVICE | Site: SPINE LUMBAR | Status: FUNCTIONAL

## 2017-10-04 DEVICE — SCREW SPNL L40MM OD6.5MM TI ST MULTAXL CANN FULL THRD HELCL: Type: IMPLANTABLE DEVICE | Site: SPINE LUMBAR | Status: FUNCTIONAL

## 2017-10-04 RX ORDER — SODIUM CHLORIDE, SODIUM LACTATE, POTASSIUM CHLORIDE, CALCIUM CHLORIDE 600; 310; 30; 20 MG/100ML; MG/100ML; MG/100ML; MG/100ML
25 INJECTION, SOLUTION INTRAVENOUS CONTINUOUS
Status: DISPENSED | OUTPATIENT
Start: 2017-10-04 | End: 2017-10-05

## 2017-10-04 RX ORDER — DIPHENHYDRAMINE HYDROCHLORIDE 50 MG/ML
25 INJECTION, SOLUTION INTRAMUSCULAR; INTRAVENOUS
Status: DISCONTINUED | OUTPATIENT
Start: 2017-10-04 | End: 2017-10-04 | Stop reason: HOSPADM

## 2017-10-04 RX ORDER — CARVEDILOL 6.25 MG/1
6.25 TABLET ORAL
Status: DISCONTINUED | OUTPATIENT
Start: 2017-10-04 | End: 2017-10-06 | Stop reason: HOSPADM

## 2017-10-04 RX ORDER — SODIUM CHLORIDE 0.9 % (FLUSH) 0.9 %
5-10 SYRINGE (ML) INJECTION AS NEEDED
Status: DISCONTINUED | OUTPATIENT
Start: 2017-10-04 | End: 2017-10-06 | Stop reason: HOSPADM

## 2017-10-04 RX ORDER — SODIUM CHLORIDE, SODIUM LACTATE, POTASSIUM CHLORIDE, CALCIUM CHLORIDE 600; 310; 30; 20 MG/100ML; MG/100ML; MG/100ML; MG/100ML
75 INJECTION, SOLUTION INTRAVENOUS CONTINUOUS
Status: DISCONTINUED | OUTPATIENT
Start: 2017-10-04 | End: 2017-10-04 | Stop reason: HOSPADM

## 2017-10-04 RX ORDER — HYDROMORPHONE HYDROCHLORIDE 2 MG/ML
INJECTION, SOLUTION INTRAMUSCULAR; INTRAVENOUS; SUBCUTANEOUS AS NEEDED
Status: DISCONTINUED | OUTPATIENT
Start: 2017-10-04 | End: 2017-10-04 | Stop reason: HOSPADM

## 2017-10-04 RX ORDER — CEFAZOLIN SODIUM 2 G/50ML
2 SOLUTION INTRAVENOUS
Status: COMPLETED | OUTPATIENT
Start: 2017-10-04 | End: 2017-10-04

## 2017-10-04 RX ORDER — OXYCODONE AND ACETAMINOPHEN 10; 325 MG/1; MG/1
2 TABLET ORAL
Status: DISCONTINUED | OUTPATIENT
Start: 2017-10-04 | End: 2017-10-06 | Stop reason: HOSPADM

## 2017-10-04 RX ORDER — VECURONIUM BROMIDE FOR INJECTION 1 MG/ML
INJECTION, POWDER, LYOPHILIZED, FOR SOLUTION INTRAVENOUS AS NEEDED
Status: DISCONTINUED | OUTPATIENT
Start: 2017-10-04 | End: 2017-10-04 | Stop reason: HOSPADM

## 2017-10-04 RX ORDER — VANCOMYCIN HYDROCHLORIDE 1 G/20ML
INJECTION, POWDER, LYOPHILIZED, FOR SOLUTION INTRAVENOUS AS NEEDED
Status: DISCONTINUED | OUTPATIENT
Start: 2017-10-04 | End: 2017-10-04 | Stop reason: HOSPADM

## 2017-10-04 RX ORDER — HYDROMORPHONE HYDROCHLORIDE 2 MG/ML
2 INJECTION, SOLUTION INTRAMUSCULAR; INTRAVENOUS; SUBCUTANEOUS
Status: DISCONTINUED | OUTPATIENT
Start: 2017-10-04 | End: 2017-10-06

## 2017-10-04 RX ORDER — ACETAMINOPHEN 325 MG/1
650 TABLET ORAL
Status: DISCONTINUED | OUTPATIENT
Start: 2017-10-04 | End: 2017-10-06 | Stop reason: HOSPADM

## 2017-10-04 RX ORDER — HYDROMORPHONE HYDROCHLORIDE 2 MG/ML
0.5 INJECTION, SOLUTION INTRAMUSCULAR; INTRAVENOUS; SUBCUTANEOUS
Status: DISCONTINUED | OUTPATIENT
Start: 2017-10-04 | End: 2017-10-04

## 2017-10-04 RX ORDER — FENTANYL CITRATE 50 UG/ML
50 INJECTION, SOLUTION INTRAMUSCULAR; INTRAVENOUS
Status: DISCONTINUED | OUTPATIENT
Start: 2017-10-04 | End: 2017-10-04 | Stop reason: HOSPADM

## 2017-10-04 RX ORDER — FENTANYL CITRATE 50 UG/ML
INJECTION, SOLUTION INTRAMUSCULAR; INTRAVENOUS AS NEEDED
Status: DISCONTINUED | OUTPATIENT
Start: 2017-10-04 | End: 2017-10-04 | Stop reason: HOSPADM

## 2017-10-04 RX ORDER — HYDROMORPHONE HYDROCHLORIDE 1 MG/ML
2 INJECTION, SOLUTION INTRAMUSCULAR; INTRAVENOUS; SUBCUTANEOUS
Status: DISCONTINUED | OUTPATIENT
Start: 2017-10-04 | End: 2017-10-04

## 2017-10-04 RX ORDER — LISINOPRIL 20 MG/1
40 TABLET ORAL DAILY
Status: DISCONTINUED | OUTPATIENT
Start: 2017-10-05 | End: 2017-10-06 | Stop reason: HOSPADM

## 2017-10-04 RX ORDER — DOCUSATE SODIUM 100 MG/1
100 CAPSULE, LIQUID FILLED ORAL 2 TIMES DAILY
Status: DISCONTINUED | OUTPATIENT
Start: 2017-10-04 | End: 2017-10-06 | Stop reason: HOSPADM

## 2017-10-04 RX ORDER — PROPOFOL 10 MG/ML
INJECTION, EMULSION INTRAVENOUS AS NEEDED
Status: DISCONTINUED | OUTPATIENT
Start: 2017-10-04 | End: 2017-10-04 | Stop reason: HOSPADM

## 2017-10-04 RX ORDER — DEXAMETHASONE SODIUM PHOSPHATE 4 MG/ML
INJECTION, SOLUTION INTRA-ARTICULAR; INTRALESIONAL; INTRAMUSCULAR; INTRAVENOUS; SOFT TISSUE AS NEEDED
Status: DISCONTINUED | OUTPATIENT
Start: 2017-10-04 | End: 2017-10-04 | Stop reason: HOSPADM

## 2017-10-04 RX ORDER — FENTANYL CITRATE 50 UG/ML
50 INJECTION, SOLUTION INTRAMUSCULAR; INTRAVENOUS AS NEEDED
Status: DISCONTINUED | OUTPATIENT
Start: 2017-10-04 | End: 2017-10-04

## 2017-10-04 RX ORDER — DIAZEPAM 5 MG/1
10 TABLET ORAL ONCE
Status: COMPLETED | OUTPATIENT
Start: 2017-10-04 | End: 2017-10-04

## 2017-10-04 RX ORDER — HYDROMORPHONE HYDROCHLORIDE 2 MG/ML
0.5 INJECTION, SOLUTION INTRAMUSCULAR; INTRAVENOUS; SUBCUTANEOUS
Status: COMPLETED | OUTPATIENT
Start: 2017-10-04 | End: 2017-10-04

## 2017-10-04 RX ORDER — LIDOCAINE HYDROCHLORIDE 10 MG/ML
0.1 INJECTION INFILTRATION; PERINEURAL AS NEEDED
Status: DISCONTINUED | OUTPATIENT
Start: 2017-10-04 | End: 2017-10-06 | Stop reason: HOSPADM

## 2017-10-04 RX ORDER — ALBUTEROL SULFATE 90 UG/1
2 AEROSOL, METERED RESPIRATORY (INHALATION)
Status: DISCONTINUED | OUTPATIENT
Start: 2017-10-04 | End: 2017-10-04

## 2017-10-04 RX ORDER — MIDAZOLAM HYDROCHLORIDE 1 MG/ML
INJECTION, SOLUTION INTRAMUSCULAR; INTRAVENOUS AS NEEDED
Status: DISCONTINUED | OUTPATIENT
Start: 2017-10-04 | End: 2017-10-04 | Stop reason: HOSPADM

## 2017-10-04 RX ORDER — FAMOTIDINE 20 MG/1
20 TABLET, FILM COATED ORAL ONCE
Status: COMPLETED | OUTPATIENT
Start: 2017-10-04 | End: 2017-10-04

## 2017-10-04 RX ORDER — ALBUTEROL SULFATE 0.83 MG/ML
2.5 SOLUTION RESPIRATORY (INHALATION)
Status: DISCONTINUED | OUTPATIENT
Start: 2017-10-04 | End: 2017-10-06 | Stop reason: HOSPADM

## 2017-10-04 RX ORDER — SODIUM CHLORIDE 0.9 % (FLUSH) 0.9 %
5-10 SYRINGE (ML) INJECTION EVERY 8 HOURS
Status: DISCONTINUED | OUTPATIENT
Start: 2017-10-04 | End: 2017-10-06 | Stop reason: HOSPADM

## 2017-10-04 RX ORDER — ONDANSETRON 2 MG/ML
4 INJECTION INTRAMUSCULAR; INTRAVENOUS
Status: DISCONTINUED | OUTPATIENT
Start: 2017-10-04 | End: 2017-10-06 | Stop reason: HOSPADM

## 2017-10-04 RX ORDER — LIDOCAINE HYDROCHLORIDE 20 MG/ML
INJECTION, SOLUTION EPIDURAL; INFILTRATION; INTRACAUDAL; PERINEURAL AS NEEDED
Status: DISCONTINUED | OUTPATIENT
Start: 2017-10-04 | End: 2017-10-04 | Stop reason: HOSPADM

## 2017-10-04 RX ORDER — DEXTROSE, SODIUM CHLORIDE, AND POTASSIUM CHLORIDE 5; .45; .15 G/100ML; G/100ML; G/100ML
100 INJECTION INTRAVENOUS CONTINUOUS
Status: DISCONTINUED | OUTPATIENT
Start: 2017-10-04 | End: 2017-10-06 | Stop reason: HOSPADM

## 2017-10-04 RX ADMIN — SODIUM CHLORIDE, SODIUM LACTATE, POTASSIUM CHLORIDE, AND CALCIUM CHLORIDE 25 ML/HR: 600; 310; 30; 20 INJECTION, SOLUTION INTRAVENOUS at 07:09

## 2017-10-04 RX ADMIN — DIAZEPAM 10 MG: 5 TABLET ORAL at 23:38

## 2017-10-04 RX ADMIN — VECURONIUM BROMIDE FOR INJECTION 7 MG: 1 INJECTION, POWDER, LYOPHILIZED, FOR SOLUTION INTRAVENOUS at 07:42

## 2017-10-04 RX ADMIN — Medication 10 ML: at 13:31

## 2017-10-04 RX ADMIN — OXYCODONE HYDROCHLORIDE AND ACETAMINOPHEN 2 TABLET: 10; 325 TABLET ORAL at 18:10

## 2017-10-04 RX ADMIN — HYDROMORPHONE HYDROCHLORIDE 0.4 MG: 2 INJECTION, SOLUTION INTRAMUSCULAR; INTRAVENOUS; SUBCUTANEOUS at 11:07

## 2017-10-04 RX ADMIN — CEFAZOLIN SODIUM 2 G: 2 SOLUTION INTRAVENOUS at 07:49

## 2017-10-04 RX ADMIN — HYDROMORPHONE HYDROCHLORIDE 0.5 MG: 2 INJECTION INTRAMUSCULAR; INTRAVENOUS; SUBCUTANEOUS at 11:45

## 2017-10-04 RX ADMIN — HYDROMORPHONE HYDROCHLORIDE 0.5 MG: 2 INJECTION INTRAMUSCULAR; INTRAVENOUS; SUBCUTANEOUS at 12:18

## 2017-10-04 RX ADMIN — FENTANYL CITRATE 100 MCG: 50 INJECTION, SOLUTION INTRAMUSCULAR; INTRAVENOUS at 07:41

## 2017-10-04 RX ADMIN — SODIUM CHLORIDE, SODIUM LACTATE, POTASSIUM CHLORIDE, AND CALCIUM CHLORIDE: 600; 310; 30; 20 INJECTION, SOLUTION INTRAVENOUS at 08:51

## 2017-10-04 RX ADMIN — CEFAZOLIN SODIUM 1 G: 1 INJECTION, POWDER, FOR SOLUTION INTRAMUSCULAR; INTRAVENOUS at 18:11

## 2017-10-04 RX ADMIN — MIDAZOLAM HYDROCHLORIDE 2 MG: 1 INJECTION, SOLUTION INTRAMUSCULAR; INTRAVENOUS at 07:37

## 2017-10-04 RX ADMIN — DEXTROSE MONOHYDRATE, SODIUM CHLORIDE, AND POTASSIUM CHLORIDE 100 ML/HR: 50; 4.5; 1.49 INJECTION, SOLUTION INTRAVENOUS at 13:30

## 2017-10-04 RX ADMIN — VECURONIUM BROMIDE FOR INJECTION 3 MG: 1 INJECTION, POWDER, LYOPHILIZED, FOR SOLUTION INTRAVENOUS at 09:01

## 2017-10-04 RX ADMIN — HYDROMORPHONE HYDROCHLORIDE 2 MG: 2 INJECTION INTRAMUSCULAR; INTRAVENOUS; SUBCUTANEOUS at 15:38

## 2017-10-04 RX ADMIN — VECURONIUM BROMIDE FOR INJECTION 3 MG: 1 INJECTION, POWDER, LYOPHILIZED, FOR SOLUTION INTRAVENOUS at 09:42

## 2017-10-04 RX ADMIN — DEXTROSE MONOHYDRATE, SODIUM CHLORIDE, AND POTASSIUM CHLORIDE 100 ML/HR: 50; 4.5; 1.49 INJECTION, SOLUTION INTRAVENOUS at 23:39

## 2017-10-04 RX ADMIN — PROPOFOL 150 MG: 10 INJECTION, EMULSION INTRAVENOUS at 07:42

## 2017-10-04 RX ADMIN — DEXAMETHASONE SODIUM PHOSPHATE 4 MG: 4 INJECTION, SOLUTION INTRA-ARTICULAR; INTRALESIONAL; INTRAMUSCULAR; INTRAVENOUS; SOFT TISSUE at 08:34

## 2017-10-04 RX ADMIN — FENTANYL CITRATE 50 MCG: 50 INJECTION, SOLUTION INTRAMUSCULAR; INTRAVENOUS at 12:34

## 2017-10-04 RX ADMIN — LIDOCAINE HYDROCHLORIDE 60 MG: 20 INJECTION, SOLUTION EPIDURAL; INFILTRATION; INTRACAUDAL; PERINEURAL at 07:41

## 2017-10-04 RX ADMIN — CARVEDILOL 6.25 MG: 6.25 TABLET, FILM COATED ORAL at 20:12

## 2017-10-04 RX ADMIN — Medication 10 ML: at 23:39

## 2017-10-04 RX ADMIN — FAMOTIDINE 20 MG: 20 TABLET ORAL at 07:11

## 2017-10-04 RX ADMIN — OXYCODONE HYDROCHLORIDE AND ACETAMINOPHEN 2 TABLET: 10; 325 TABLET ORAL at 22:02

## 2017-10-04 RX ADMIN — HYDROMORPHONE HYDROCHLORIDE 2 MG: 2 INJECTION INTRAMUSCULAR; INTRAVENOUS; SUBCUTANEOUS at 20:12

## 2017-10-04 RX ADMIN — DOCUSATE SODIUM 100 MG: 100 CAPSULE, LIQUID FILLED ORAL at 20:12

## 2017-10-04 RX ADMIN — OXYCODONE HYDROCHLORIDE AND ACETAMINOPHEN 2 TABLET: 10; 325 TABLET ORAL at 13:29

## 2017-10-04 RX ADMIN — VECURONIUM BROMIDE FOR INJECTION 3 MG: 1 INJECTION, POWDER, LYOPHILIZED, FOR SOLUTION INTRAVENOUS at 08:27

## 2017-10-04 NOTE — IP AVS SNAPSHOT
[address] Patient: Barb Adhikari MRN: [id number] :1960 Current Discharge Medication List  
  
CONTINUE these medications which have NOT CHANGED Dose & Instructions Dispensing Information Comments Morning Noon Evening Bedtime ADDERALL 10 mg tablet Generic drug:  dextroamphetamine-amphetamine Your last dose was: Your next dose is:    
   
   
 Dose:  15 mg Take 15 mg by mouth three (3) times daily. Refills:  0  
     
   
   
   
  
 CARBATROL PO Your last dose was: Your next dose is:    
   
   
 Dose:  200 mg Take 200 mg by mouth two (2) times a day. 1 in morning and 2 at 1600 Refills:  0  
     
   
   
   
  
 CARVEDILOL PO Your last dose was: Your next dose is:    
   
   
 Dose:  6.25 mg Take 6.25 mg by mouth two (2) times a day. Refills:  0  
     
   
   
   
  
 cyclobenzaprine 10 mg tablet Commonly known as:  FLEXERIL Your last dose was: Your next dose is:    
   
   
 TK 1 T PO TID Refills:  1  
     
   
   
   
  
 diazePAM 10 mg tablet Commonly known as:  VALIUM Your last dose was: Your next dose is:    
   
   
 TK 1 AND 1/2 T PO QHS PRN Refills:  1  
     
   
   
   
  
 docusate sodium 100 mg capsule Commonly known as:  Cayetano Booze Your last dose was: Your next dose is:    
   
   
 Dose:  100 mg Take 1 Cap by mouth two (2) times daily as needed for Constipation for up to 90 days. Quantity:  60 Cap Refills:  2  
     
   
   
   
  
 gabapentin 300 mg capsule Commonly known as:  NEURONTIN Your last dose was: Your next dose is:    
   
   
 TK 1 C PO TID Refills:  2  
     
   
   
   
  
 lidocaine 2 % jelly Commonly known as:  XYLOCAINE Your last dose was:     
   
Your next dose is:    
   
   
 GIORGI SML AMT EXT AA UP TO QID PRF PAIN  
 Refills:  0 LISINOPRIL PO Your last dose was: Your next dose is:    
   
   
 Dose:  40 mg Take 40 mg by mouth daily. Refills:  0 LIVALO 2 mg tablet Generic drug:  pitavastatin calcium Your last dose was: Your next dose is:    
   
   
 TK 1 T PO D Refills:  5  
     
   
   
   
  
 multivitamin tablet Commonly known as:  ONE A DAY Your last dose was: Your next dose is:    
   
   
 Dose:  1 Tab Take 1 Tab by mouth daily. Refills:  0  
     
   
   
   
  
 NUCYNTA 75 mg tablet Generic drug:  tapentadol Your last dose was: Your next dose is:    
   
   
 Dose:  75 mg Take 75 mg by mouth four (4) times daily. Refills:  0 VENTOLIN HFA 90 mcg/actuation inhaler Generic drug:  albuterol Your last dose was: Your next dose is:    
   
   
 Dose:  2 Puff Take 2 Puffs by inhalation every four (4) hours as needed for Wheezing. Refills:  0 STOP taking these medications  ELIQUIS PO

## 2017-10-04 NOTE — PERIOP NOTES
Rec'd care of pt from OR via bed. Resp even and unlabored. Attached to monitor. VSS. OR, MAR and anesthesia report acknowledged. Chinchilla patent. Will cont to monitor. 1150  Dr. Angelina Lam at bedside. Examined. Pt c/o left thigh pain/pressure. Dr. Angelina Lam acknowledged. Full range of motion performed with pt able to lock knees bilaterally without difficulty. Will cont to monitor. 1250  TRANSFER - OUT REPORT:    Verbal report given to MARK Yanez (name) on Graham Schneider  being transferred to 2400 (unit) for routine post - op       Report consisted of patients Situation, Background, Assessment and   Recommendations(SBAR). Information from the following report(s) SBAR, Kardex, OR Summary, Intake/Output, MAR and Cardiac Rhythm sinus rhythm and sinus stephenie was reviewed with the receiving nurse. Lines:   Peripheral IV 10/04/17 Right Wrist (Active)   Site Assessment Clean, dry, & intact 10/4/2017 11:18 AM   Phlebitis Assessment 0 10/4/2017 11:18 AM   Infiltration Assessment 0 10/4/2017 11:18 AM   Dressing Status Clean, dry, & intact 10/4/2017 11:18 AM   Dressing Type Transparent;Tape 10/4/2017 11:18 AM   Hub Color/Line Status Pink; Infusing 10/4/2017 11:18 AM   Action Taken Open ports on tubing capped 10/4/2017 11:18 AM   Alcohol Cap Used Yes 10/4/2017 11:18 AM       Peripheral IV 10/04/17 Right Hand (Active)   Site Assessment Clean, dry, & intact 10/4/2017 11:18 AM   Phlebitis Assessment 0 10/4/2017 11:18 AM   Infiltration Assessment 0 10/4/2017 11:18 AM   Dressing Status Clean, dry, & intact 10/4/2017 11:18 AM   Dressing Type Transparent;Tape 10/4/2017 11:18 AM   Hub Color/Line Status Pink;Capped 10/4/2017 11:18 AM   Action Taken Open ports on tubing capped 10/4/2017 11:18 AM   Alcohol Cap Used Yes 10/4/2017 11:18 AM        Opportunity for questions and clarification was provided.       Patient transported with:   O2 @ 2 liters  Tech

## 2017-10-04 NOTE — PROGRESS NOTES
Pt arrived to room 2411, on bed, white board filled out, call bell within reach and explained. Diet tray ordered, pt provided with ice water and crackers. Told pt that Percocet will be given shortly, pt stated he just wanted Dilaudid, educated that [address] is also a medication needed and it will last longer. Pt is worried about constipation. Incision clean, dry, intact. Pt laying on side with pillows for support. States he has a cramping pain in left thigh. 1538 Pt complaining of pain, administered IV Dilaudid. Pt is about to get up with PT.    1645 Pt in bed, eating dinner. Stated pain is much better after dilaudid. 1800 Administered PRN Percocet for pain. Attempted to reach pt's friend, no answer. 1812 Attempted to reach pt's friend again, person who answered stated that the number provided was the wrong number. Bedside shift change report given to Patito Ayon RN (oncoming nurse) by Jody Crooks RN (offgoing nurse). Report included the following information SBAR, Kardex, OR Summary, Intake/Output, MAR and Recent Results.

## 2017-10-04 NOTE — CONSULTS
[facility]  Hospitalist Division    Consult Note    Patient: Cookie Uribe MRN: [id number]  [facility]: [id number]    YOB: 1960  Age: 55 y.o. Sex: male    DOA: 10/4/2017 LOS:  LOS: 0 days        Requesting Physician:  Dr. Fadia Jama   Reason for Consultation:  Medical Management    Chief Complaint:  Back pain     Assessment/Plan     Patient Active Problem List   Diagnosis Code    Right renal mass N28.89    ADD (attention deficit disorder) F98.8    Chronic left-sided back pain M54.9, G89.29    DDD (degenerative disc disease), thoracolumbar M51.35    Essential hypertension I10    H/O traumatic brain injury Z87.820    High risk medications (not anticoagulants) long-term use Z79.899    Paroxysmal atrial fibrillation (HCC) I48.0    Primary insomnia F51.01    Seizures ([facility] [address] R56.9    Renal mass N28.89    Renal cell cancer ([facility] [address] C64.9    Lumbar stenosis M48.061       A/P:  - S/p Lumbar decompression L4-S1 with bilateral exploration and decompression of the L4-5 and S1 nerve roots with foraminotomy and  partial facetectomy: diet and mobilization per Ortho   - Afib: resume Eliquis when ok with Ortho   - HTN: Coreg, Lisinopril  -Asthma: Albuterol PRN   -We appreciate the consultation for medical management and appreciate being able to be involved with their care during hospitalization. HPI:     Cookie Uribe is a 55 y.o. male with a hx of HTN, Afib, asthma, ADD and lumbar stenosis who underwent Lumbar decompression L4-S1 with bilateral exploration and decompression of the L4-5 and S1 nerve roots with foraminotomy and partial facetectomy. Patient reports ongoing severe, stabbing back pain with radiation to LLE. Patient reports numbness to left lateral calf and left ankle. Patient denies previous medical history to include CVA, TIA, MI, CAD, DM, thyroid or respiratory disorders. Hospitalist Team is consulted for ongoing medical management.      Past Medical History: Diagnosis Date    Arrhythmia     a fib    Arthritis     Asthma     Hypertension     Insomnia     Ren[facility] cell cancer ([facility] [address]     Seizures ([facility] [address] 2002    Traumatic brain injury, closed ([facility] [address] 1999       Past Surgical History:   Procedure Laterality Date    HX ANKLE FRACTURE TX      HX CERVICAL FUSION  2001    Had to be removed    HX HERNIA REPAIR      HX KNEE ARTHROSCOPY      HX LUMBAR FUSION  2001    Had to be removed     HX NEPHRECTOMY  08/10/2017    Hand-assisted laparoscopic right nephrectomy. Dr. Rich Gupta HX SPINAL CORD DECOMPRESSION  01/2017    L4-S1--     NEUROLOGICAL PROCEDURE UNLISTED      BACK & NECK SX       History reviewed. No pertinent family history. Social History     Social History    Marital status: LEGALLY      Spouse name: N/A    Number of children: N/A    Years of education: N/A     Social History Main Topics    Smoking status: Never Smoker    Smokeless tobacco: Never Used    Alcohol use No    Drug use: No    Sexual activity: Not Asked     Other Topics Concern    None     Social History Narrative       Prior to Admission medications    Medication Sig Start Date End Date Taking? Authorizing Provider   tapentadol (NUCYNTA) 75 mg tablet Take 75 mg by mouth four (4) times daily. Yes Historical Provider   LIVALO 2 mg tablet TK 1 T PO D 9/6/17  Yes Historical Provider   lidocaine (XYLOCAINE) 2 % jelly GIORGI SML AMT EXT AA UP TO QID PRF PAIN 8/22/17  Yes Historical Provider   diazePAM (VALIUM) 10 mg tablet TK 1 AND 1/2 T PO QHS PRN 6/7/17  Yes Historical Provider   gabapentin (NEURONTIN) 300 mg capsule TK 1 C PO TID 6/30/17  Yes Historical Provider   dextroamphetamine-amphetamine (ADDERALL) 10 mg tablet Take 15 mg by mouth three (3) times daily. Yes Historical Provider   albuterol (VENTOLIN HFA) 90 mcg/actuation inhaler Take 2 Puffs by inhalation every four (4) hours as needed for Wheezing.    Yes Historical Provider CARBAMAZEPINE (CARBATROL PO) Take 200 mg by mouth two (2) times a day. 1 in morning and 2 at 1600   Yes Historical Provider   CARVEDILOL PO Take 6.25 mg by mouth two (2) times a day. Yes Historical Provider   LISINOPRIL PO Take 40 mg by mouth daily. Yes Historical Provider   APIXABAN (ELIQUIS PO) Take 5 mg by mouth two (2) times a day. Yes Historical Provider   multivitamin (ONE A DAY) tablet Take 1 Tab by mouth daily. Yes Historical Provider   docusate sodium (COLACE) 100 mg capsule Take 1 Cap by mouth two (2) times daily as needed for Constipation for up to 90 days. 8/10/17 11/8/17  Jonathan Ledezma MD   cyclobenzaprine (FLEXERIL) 10 mg tablet TK 1 T PO TID 6/23/17   Historical Provider       Allergies   Allergen Reactions    Fentanyl Rash     With fentanyl patch only    Morphine Other (comments)    Statins-Hmg-Coa Reductase Inhibitors Myalgia     Leg cramps       Review of Systems  - fever, - chills, - fatigue, - weight loss, - night sweats   - sore throat, - sinus congestion, - lymphadenopathy, - vision changes  - CP, -  palpitations  - dyspnea on exertion, - dyspnea at rest, - cough, - hemoptysis  - nausea, - vomiting, - diarrhea, - abdominal pain, - reflux, - dysphagia  - dysuria, - hematuria, - urinary frequency  - rash, - pruritis  + back pain, - neck pain, - myalgia, - arthralgia  - H/A, + numbness, - tingling, - weakness, - slurred speech    Physical Exam:      Visit Vitals    /80 (BP 1 Location: Left arm, BP Patient Position: At rest)    Pulse 83    Temp 98.2 °F (36.8 °C)    Resp 15    Ht 5' 9\" (1.753 m)    Wt 89.8 kg (198 lb)    SpO2 98%    BMI 29.24 kg/m2       Physical Exam:  Gen: In general, this is a well nourished  male in no acute distress on 2L NC.  HEENT: Sclerae nonicteric. Oral mucous membranes moist.    Neck: Supple with midline trachea. CV: RRR without murmur or rub appreciated. Resp:Respirations are unlabored without use of accessory muscles.  Lung fields bilaterally without wheezes or rhonchi. Abd: Soft, nontender, nondistended. Normoactive bowel sounds. Extrem: Extremities are warm, without cyanosis or clubbing. No pitting pretibial edema. Palpable distal pulses X 4.   Skin: Warm, no visible rashes. Surgical dressing CDI. Neuro: Patient is alert, oriented, and cooperative. No obvious focal defects. Moves all 4 extremities. Labs Reviewed:    No results found for this or any previous visit (from the past 24 hour(s)).             Toi Vega, JOLENE-RAKEL [facility]  Hospitalist Division  Pager:  [phone]  Office:  [phone]

## 2017-10-04 NOTE — OP NOTES
Audie Rebolledo    Name:  Janie Borges  MR#:  [id number]  :  1960  Account #:  [de-identified]  Date of Adm:  10/04/2017  Date of Surgery:  10/04/2017      PREOPERATIVE DIAGNOSIS: Lumbar stenosis L4-S1 with  spondylosis, status post decompression at L4-S1. POSTOPERATIVE DIAGNOSIS: Lumbar stenosis L4-S1 with  spondylosis, status post decompression at L4-S1. PROCEDURES PERFORMED  1. Lumbar decompression L4-S1 with bilateral exploration and  decompression of the L4-5 and S1 nerve roots with foraminotomy and  partial facetectomy. 2. Polaris 5.5-mm open variable-angle instrumentation, L4-S1.  3. Bilateral posterolateral spinal fusion, L4-S1, with local bone graft,  bone bank bone graft and autologous growth factor. SURGEON: MARIAN Steel MD.    Nichole Kulkarni. ANESTHESIA: General.    ESTIMATED BLOOD LOSS: 250 mL. FLUIDS: The patient received 1600 mL crystalloid fluid. URINE OUTPUT: 550 mL urine output. SPECIMENS REMOVED: No specimens. TUBES AND DRAINS: No tubes or drains. COUNTS: Needle and sponge count correct without complication. FINDINGS: The patient had previously undergone decompression at  L4-S1. There was no central stenosis. However, the patient had a  large synovial cyst in the left L5-S1 foramen, producing significant  compression of the left L5 nerve root at the ganglion level. It was  necessary to resect 75% of the joint in order to adequately access the  synovial cyst and remove it. There was mild foraminal stenosis on the  left at L4-5. No stenosis on the right L4-5, 5-1. DESCRIPTION OF PROCEDURE: Under general anesthesia, the  patient rolled in prone position on Tyler frame, peripheral nerves  padded, back prepped and draped in a sterile fashion. Previous  incision was opened, muscles subperiosteally exposed laterally to the  transverse processes, L4-5 and the sacral ala.  Visualization and  palpation of sacrum used to confirm level. Margin of previous decompression was delineated bilaterally with a  curet and the bone scar interface. Scar was removed from the dura. Decompression was widened to the L4-5 and S1 pedicles bilaterally  and the respective L4-5 and S1 nerve roots decompressed bilaterally  around the pedicles into the foramen. Performed foraminotomy and  partial facetectomy. On the left side at L5-S1, the medial 75% of the  facet joint was removed and decompression was carried into the  foraminal far lateral zone. The left L5 nerve root was dissected free  from an underlying synovial cyst, which was resected. This markedly  decompressed the left L5 nerve root. Pedicle holes made bilaterally, L4-5 and S1, under direct visual control. Holes probed with depth gauge, felt to be within bone. Metallic markers  placed. Radiographs obtained were satisfactory. Beginning first on the left-hand side, posterolateral elements of L4-S1  decorticated with Midas Oleg drill and bone graft material was placed. This was followed by placement of pedicle screws, L4-5 and S1. In a  similar fashion, right-hand side decorticated bone graft and screws  placed. Medial pedicles palpated, no exposed hardware. PA and  lateral radiographs obtained were satisfactory. Rods were then placed bilaterally followed by set screws, which were  tightened and torqued to 120-inch pounds. Gelfoam was placed over the exposed dura. Additional bone graft  placed about the rods and then the wound was closed in layers with  powdered vancomycin in the deep and subcutaneous tissue. Wound  dressed. The patient awakened and taken to recovery room in  satisfactory condition without complication. At the time of dictation, the patient not awakened sufficiently to test  motor sensation.         MD Anuj Correa / Jerson Webber  D:  10/04/2017   11:20  T:  10/04/2017   12:35  Job #:  [id number]    FAMILY NURSE PRACTITIONER, FERNY REYES, [address] [place] [address], [address] [place] [address], [phone]. DR. Hannah Campbell, [address] [facility], [address],  [address] [place] [address], [phone].

## 2017-10-04 NOTE — PERIOP NOTES
Patient interviewed in holding area, identity and procedure verified. SCDs applied prior to anesthesia induction. Pressure preset. Patient was intubated in the supine position on stretcher, then transferred to OR table with assistance and place in the prone position. Final position approved by Dr. Jennifer Jaramillo. All lines are padded and secured. Lisa hugger cover applied by anesthesia provider. Temperature monitored by anesthesia provider. 20 ml Floseal to surgical field to be used topically PRN in spine.

## 2017-10-04 NOTE — PROGRESS NOTES
Problem: Mobility Impaired (Adult and Pediatric)  Goal: *Acute Goals and Plan of Care (Insert Text)  Physical Therapy Goals  Initiated 10/4/2017 and to be accomplished within 7 day(s)  1. Patient will move from supine to sit and sit to supine , scoot up and down and roll side to side in bed with modified independence. 2. Patient will transfer from bed to chair and chair to bed with modified independence using the least restrictive device. 3. Patient will perform sit to stand with modified independence. 4. Patient will ambulate with modified independence for 300 feet with the least restrictive device. 5. Patient will ascend/descend 3 stairs with handrail(s) with modified independence. Outcome: Progressing Towards Goal  PHYSICAL THERAPY EVALUATION      [facility] [address]  LUMBAR PRECAUTIONS     DO NOT TWIST your back. DO NOT BEND to  objects. Use the Squat Lift method or use a *REACHER STICK. Get out of bed using the Log Roll method. DO NOT LIFT more than 5 pounds until cleared by your physician. DO NOT RIDE in a car except to go home from the hospital or to see your physician. DO NOT DRIVE until cleared by your physician. Limit sitting to less than one hour at a time. Use a long handled back brush/sponge to wash your feet if you cannot reach them. Squat or sit on a chair when removing items from the refrigerator. Put all frequently used kitchen items within easy reach. Sit to put on pants, socks, and shoes. Do not perform lower body dressing while standing up. Carry items close to your body. If needed, sit on a shower chair and use an extended hand-held shower for bathing. Do not shower until cleared by physician. Conserve energy by pacing  yourself during your daily activities. *Reachers are available at local drug stores for about $10 - $15 or can be ordered from a catalog provided by the therapy department. In drug stores they are often sold  under the name of Tangoe.         Patient: Shantanu Husbands [de-identified]55 y.o. 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 Patient requires between minimal assistance/contact guard assist and moderate assistance  for bed mobility, transfers and ambulation. Patient did not put brace on today but it is in room with just walking at side of bed patient was concerned with  pain  Getting medication prior to getting up  Pre 8/10 post 5-6/10. Patient demonstrates a good understanding of lumbar precautions needing reinforcement. .  Patient will benefit from skilled intervention to address the above impairments.   Patients rehabilitation potential is considered to be Fair  Factors which may influence rehabilitation potential include:   [ ]         None noted  [ ]         Mental ability/status  [X]         Medical condition  [ ]         Home/family situation and support systems  [ ]         Safety awareness  [X]         Pain tolerance/management  [ ]         Other:        PLAN :  Recommendations and Planned Interventions:  [X]           Bed Mobility Training             [X]    Neuromuscular Re-Education  [X]           Transfer Training                   [X]    Orthotic/Prosthetic Training  [X]           Gait Training                          [ ]    Modalities  [ ]           Therapeutic Exercises          [ ]    Edema Management/Control  [X]           Therapeutic Activities            [X]    Patient and Family Training/Education  [ ]           Other (comment):     Frequency/Duration: Patient will be followed by physical therapy 1-2 times per day/4-7 days per week to address goals. Discharge Recommendations: Rehab and Home Health  Further Equipment Recommendations for Discharge: rolling walker       SUBJECTIVE:   Patient stated I have been living on peanut butter and jelly sandwiches.       OBJECTIVE DATA SUMMARY:       Past Medical History:   Diagnosis Date    Arrhythmia       a fib    Arthritis      Asthma      Hypertension      Insomnia      [facility] ([facility] [address]      Seizures ([facility] [address] 2002    Traumatic brain injury, closed ([facility] [address] 1999     Past Surgical History:   Procedure Laterality Date    HX ANKLE FRACTURE TX        HX CERVICAL FUSION   2001     Had to be removed    HX HERNIA REPAIR        HX KNEE ARTHROSCOPY        HX LUMBAR FUSION   2001     Had to be removed     HX NEPHRECTOMY   08/10/2017     Hand-assisted laparoscopic right nephrectomy. Dr. Daina Bryan [address]   01/2017     L4-S1--     NEUROLOGICAL PROCEDURE UNLISTED         BACK & NECK SX     Barriers to Learning/Limitations: None  Compensate with: visual, verbal, tactile, kinesthetic cues/model  GCODES(GP):Mobility  Current  CL= 60-79%   Goal  CI= 1-19%. The severity rating is based on the [employer] Balance Scale2/5   [address] Standing Balance Scale2/5  0: Pt performs 25% or less of standing activity (Max assist) CN, 100% impaired. 1: Pt supports self with upper extremities but requires therapist assistance. Pt performs 25-50% of effort (Mod assist) CM, 80% to <100% impaired. 1+: Pt supports self with upper extremities but requires therapist assistance. Pt performs >50% effort. (Min assist). CL, 60% to <80% impaired.   2: Pt supports self independently with both upper extremities (walker, crutches, parallel bars). CL, 60% to <80% impaired. 2+: Pt support self independently with 1 upper extremity (cane, crutch, 1 parallel bar). CK, 40% to <60% impaired. 3: Pt stands without upper extremity support for up to 30 seconds. CK, 40% to <60% impaired. 3+: Pt stands without upper extremity support for 30 seconds or greater. CJ, 20% to <40% impaired. 4: Pt independently moves and returns center of gravity 1-2 inches in one plane. CJ, 20% to <40% impaired. 4+: Pt independently moves and returns center of gravity 1-2 inches in multiple planes. CI, 1% to <20% impaired. 5: Pt independently moves and returns center of gravity in all planes greater than 2 inches. CH, 0% impaired. Eval Complexity: History: HIGH Complexity :3+ comorbidities / personal factors will impact the outcome/ POC Exam:MEDIUM Complexity : 3 Standardized tests and measures addressing body structure, function, activity limitation and / or participation in recreation  Presentation: MEDIUM Complexity : Evolving with changing characteristics  Clinical Decision Making:Medium Complexity [facility] Standing Balance Scale2/5 Overall Complexity:MEDIUM  Prior Level of Function/Home Situation: I with adl's and ambulation with straight cane   Home Situation  Home Environment: Private residence  # Steps to Enter: 3  Living Alone: Yes  Support Systems: Friends \ neighbors  Patient Expects to be Discharged to[de-identified] Private residence  Current DME Used/Available at Home: Kyler Kussmaul, straight, Brace/Splint  Critical Behavior:  Neurologic State: Alert  Orientation Level: Oriented X4  Cognition: Follows commands  Safety/Judgement: Awareness of environment; Fall prevention  Psychosocial  Patient Behaviors: Calm; Cooperative  Skin Condition/Temp: Warm;Dry  Skin Integrity: Incision (comment) (mid lower back)  Skin Integumentary  Skin Color: Appropriate for ethnicity  Skin Condition/Temp: Warm;Dry  Skin Integrity: Incision (comment) (mid lower back)  Turgor: Non-tenting  Strength:    Strength: Generally decreased, functional (3+/5 ) both legs   Tone & Sensation:   Tone: Normal  Sensation: Impaired left thigh   Range Of Motion:  AROM: Generally decreased, functional  PROM: Within functional limits  Functional Mobility:  Bed Mobility:  Rolling: Moderate assistance; Additional time;Assist x1  Supine to Sit: Moderate assistance;Assist x1;Additional time  Sit to Supine: Moderate assistance; Additional time;Assist x1  Transfers:  Sit to Stand: Minimum assistance; Moderate assistance; Additional time;Assist x1  Stand to Sit: Minimum assistance; Moderate assistance; Additional time;Assist x1  Balance:   Sitting: Impaired  Sitting - Static: Fair (occasional)  Sitting - Dynamic: Fair (occasional)  Standing: Impaired  Standing - Static: Fair  Standing - Dynamic : Fair  Ambulation/Gait Training:  Distance (ft): 5 Feet (ft) (x2)  Assistive Device: Walker, rolling (walking by bed only no brace on  but is in room. )  Ambulation - Level of Assistance: Minimal assistance;Contact guard assistance; Additional time;Assist x1  Gait Description (WDL): Exceptions to WDL  Gait Abnormalities: Antalgic;Decreased step clearance;Shuffling gait; Path deviations  Base of Support: Center of gravity altered  Speed/Su: Delayed; Slow  Step Length: Left shortened;Right shortened  Interventions: Safety awareness training;Verbal cues; Visual/Demos     Pain: pre 8/10 post 5-6/10  Pain Scale 1: Numeric (0 - 10)  Activity Tolerance:   Fair   Please refer to the flowsheet for vital signs taken during this treatment.   After treatment:   [ ]         Patient left in no apparent distress sitting up in chair  [X]         Patient left in no apparent distress in bed  [X]         Call bell left within reach  [X]         Nursing notified  [ ]         Caregiver present  [ ]         Bed alarm activated      COMMUNICATION/EDUCATION:   [X]         Fall prevention education was provided and the patient/caregiver indicated understanding. [X]         Patient/family have participated as able in goal setting and plan of care. [X]         Patient/family agree to work toward stated goals and plan of care. [ ]         Patient understands intent and goals of therapy, but is neutral about his/her participation. [ ]         Patient is unable to participate in goal setting and plan of care. Patient educated on role of physical therapy and lumbar precautions needs reinforcement.  .       Thank you for this referral.  Gigi Jacobson, PT   Time Calculation: 35 mins

## 2017-10-04 NOTE — ANESTHESIA POSTPROCEDURE EVALUATION
Post-Anesthesia Evaluation and Assessment    Patient: Viktoriya Rodriguez MRN: [id number]  SSN: [id number]    YOB: 1960  Age: 55 y.o. Sex: male       Cardiovascular Function/Vital Signs  Visit Vitals    /60    Pulse 63    Temp 36.8 °C (98.2 °F)    Resp 15    Ht 5' 9\" (1.753 m)    Wt 89.8 kg (198 lb)    SpO2 100%    BMI 29.24 kg/m2       Patient is status post general anesthesia for Procedure(s):  DECOMPRESSION AND FUSION, POLARIS L4-S1. Nausea/Vomiting: None    Postoperative hydration reviewed and adequate. Pain:  Pain Scale 1: Numeric (0 - 10) (10/04/17 1218)  Pain Intensity 1: 10 (10/04/17 1218)   Managed    Neurological Status:   Neuro (WDL): Within Defined Limits (10/04/17 0631)   At baseline    Mental Status and Level of Consciousness: Arousable    Pulmonary Status:   O2 Device: Oxygen mask;Oral airway (10/04/17 1121)   Adequate oxygenation and airway patent    Complications related to anesthesia: None    Post-anesthesia assessment completed.  No concerns    Signed By: Libertad Senior MD     October 4, 2017

## 2017-10-04 NOTE — ANESTHESIA PREPROCEDURE EVALUATION
Anesthetic History   No history of anesthetic complications            Review of Systems / Medical History  Patient summary reviewed and pertinent labs reviewed    Pulmonary  Within defined limits          Asthma        Neuro/Psych     seizures         Cardiovascular  Within defined limits  Hypertension        Dysrhythmias : atrial fibrillation      Exercise tolerance: >4 METS     GI/Hepatic/Renal  Within defined limits              Endo/Other  Within defined limits      Arthritis     Other Findings   Comments:   Risk Factors for Postoperative nausea/vomiting:       History of postoperative nausea/vomiting? NO       Female? NO       Motion sickness? YES       Intended opioid administration for postoperative analgesia? YES      Smoking Abstinence  Current Smoker? NO  Elective Surgery? YES  Seen preoperatively by anesthesiologist or proxy prior to day of surgery? YES  Pt abstained from smoking 24 hours prior to anesthesia?  YES                     Physical Exam    Airway  Mallampati: II  TM Distance: 4 - 6 cm  Neck ROM: normal range of motion   Mouth opening: Normal     Cardiovascular    Rhythm: irregular  Rate: normal         Dental    Dentition: Full upper dentures     Pulmonary  Breath sounds clear to auscultation               Abdominal  GI exam deferred       Other Findings            Anesthetic Plan    ASA: 3  Anesthesia type: general          Induction: Intravenous  Anesthetic plan and risks discussed with: Patient

## 2017-10-04 NOTE — H&P
Day of Surgery Update:  Cookie Uribe was seen and examined. History and physical has been reviewed. The patient has been examined.  There have been no significant clinical changes since the completion of the originally dated History and Physical.    Signed By: Kristy Musa MD     October 4, 2017 7:53 AM

## 2017-10-04 NOTE — OP NOTES
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

## 2017-10-04 NOTE — IP AVS SNAPSHOT
[address] Patient: Graham Schneider MRN: [id number] :1960 You are allergic to the following Allergen Reactions Fentanyl Rash With fentanyl patch only Morphine Other (comments) Statins-Hmg-Coa Reductase Inhibitors Myalgia Leg cramps Recent Documentation Height Weight BMI Smoking Status 1.753 m 89.8 kg 29.24 kg/m2 Never Smoker Emergency Contacts Name Discharge Info Relation Home Work Mobile Evgeny Dickinson N/A  AT THIS TIME [6] Brother [24]   [phone] [facility] CAREGIVER [3] Other Relative [6]   [phone] About your hospitalization You were admitted on:  2017 You last received care in the:  [facility] 2E GEN SURG You were discharged on:  2017 Unit phone number:  [phone] Why you were hospitalized Your primary diagnosis was:  Ddd (Degenerative Disc Disease), Thoracolumbar Your diagnoses also included:  Paroxysmal Atrial Fibrillation (Hcc), Seizures (Hcc), Essential Hypertension, Renal Cell Cancer (Hcc), Lumbar Stenosis Providers Seen During Your Hospitalizations Provider Role Specialty Primary office phone Tano Rodriguez MD Attending Provider Orthopedic Surgery [phone] Your Primary Care Physician (PCP) Primary Care Physician Office Phone Office Fax Sabina Mccarty [phone] Follow-up Information Follow up With Details Comments Contact Info Pat Mercado NP   [address] 
[phone] Current Discharge Medication List  
  
CONTINUE these medications which have NOT CHANGED Dose & Instructions Dispensing Information Comments Morning Noon Evening Bedtime ADDERALL 10 mg tablet Generic drug:  dextroamphetamine-amphetamine Your last dose was: Your next dose is:    
   
   
 Dose:  15 mg Take 15 mg by mouth three (3) times daily. Refills:  0  
     
   
   
   
  
 CARBATROL PO Your last dose was: Your next dose is:    
   
   
 Dose:  200 mg Take 200 mg by mouth two (2) times a day. 1 in morning and 2 at 1600 Refills:  0  
     
   
   
   
  
 CARVEDILOL PO Your last dose was: Your next dose is:    
   
   
 Dose:  6.25 mg Take 6.25 mg by mouth two (2) times a day. Refills:  0  
     
   
   
   
  
 cyclobenzaprine 10 mg tablet Commonly known as:  FLEXERIL Your last dose was: Your next dose is:    
   
   
 TK 1 T PO TID Refills:  1  
     
   
   
   
  
 diazePAM 10 mg tablet Commonly known as:  VALIUM Your last dose was: Your next dose is:    
   
   
 TK 1 AND 1/2 T PO QHS PRN Refills:  1  
     
   
   
   
  
 docusate sodium 100 mg capsule Commonly known as:  Gevena Living Your last dose was: Your next dose is:    
   
   
 Dose:  100 mg Take 1 Cap by mouth two (2) times daily as needed for Constipation for up to 90 days. Quantity:  60 Cap Refills:  2  
     
   
   
   
  
 gabapentin 300 mg capsule Commonly known as:  NEURONTIN Your last dose was: Your next dose is:    
   
   
 TK 1 C PO TID Refills:  2  
     
   
   
   
  
 lidocaine 2 % jelly Commonly known as:  XYLOCAINE Your last dose was: Your next dose is:    
   
   
 GIORGI SML AMT EXT AA UP TO QID PRF PAIN Refills:  0 LISINOPRIL PO Your last dose was: Your next dose is:    
   
   
 Dose:  40 mg Take 40 mg by mouth daily. Refills:  0 LIVALO 2 mg tablet Generic drug:  pitavastatin calcium Your last dose was: Your next dose is:    
   
   
 TK 1 T PO D Refills:  5  
     
   
   
   
  
 multivitamin tablet Commonly known as:  ONE A DAY  
   
 Your last dose was: Your next dose is:    
   
   
 Dose:  1 Tab Take 1 Tab by mouth daily. Refills:  0  
     
   
   
   
  
 NUCYNTA 75 mg tablet Generic drug:  tapentadol Your last dose was: Your next dose is:    
   
   
 Dose:  75 mg Take 75 mg by mouth four (4) times daily. Refills:  0 VENTOLIN HFA 90 mcg/actuation inhaler Generic drug:  albuterol Your last dose was: Your next dose is:    
   
   
 Dose:  2 Puff Take 2 Puffs by inhalation every four (4) hours as needed for Wheezing. Refills:  0 STOP taking these medications ELIQUIS PO Discharge Instructions High Blood Pressure: Care Instructions Your Care Instructions If your blood pressure is usually above 140/90, you have high blood pressure, or hypertension. That means the top number is 140 or higher or the bottom number is 90 or higher, or both. Despite what a lot of people think, high blood pressure usually doesn't cause headaches or make you feel dizzy or lightheaded. It usually has no symptoms. But it does increase your risk for heart attack, stroke, and kidney or eye damage. The higher your blood pressure, the more your risk increases. Your doctor will give you a goal for your blood pressure. Your goal will be based on your health and your age. An example of a goal is to keep your blood pressure below 140/90. Lifestyle changes, such as eating healthy and being active, are always important to help lower blood pressure. You might also take medicine to reach your blood pressure goal. 
Follow-up care is a key part of your treatment and safety. Be sure to make and go to all appointments, and call your doctor if you are having problems. It's also a good idea to know your test results and keep a list of the medicines you take. How can you care for yourself at home? Medical treatment · If you stop taking your medicine, your blood pressure will go back up. You may take one or more types of medicine to lower your blood pressure. Be safe with medicines. Take your medicine exactly as prescribed. Call your doctor if you think you are having a problem with your medicine. · Talk to your doctor before you start taking aspirin every day. Aspirin can help certain people lower their risk of a heart attack or stroke. But taking aspirin isn't right for everyone, because it can cause serious bleeding. · See your doctor regularly. You may need to see the doctor more often at first or until your blood pressure comes down. · If you are taking blood pressure medicine, talk to your doctor before you take decongestants or anti-inflammatory medicine, such as ibuprofen. Some of these medicines can raise blood pressure. · Learn how to check your blood pressure at home. Lifestyle changes · Stay at a healthy weight. This is especially important if you put on weight around the waist. Losing even 10 pounds can help you lower your blood pressure. · If your doctor recommends it, get more exercise. Walking is a good choice. Bit by bit, increase the amount you walk every day. Try for at least 30 minutes on most days of the week. You also may want to swim, bike, or do other activities. · Avoid or limit alcohol. Talk to your doctor about whether you can drink any alcohol. · Try to limit how much sodium you eat to less than 2,300 milligrams (mg) a day. Your doctor may ask you to try to eat less than 1,500 mg a day. · Eat plenty of fruits (such as bananas and oranges), vegetables, legumes, whole grains, and low-fat dairy products. · Lower the amount of saturated fat in your diet. Saturated fat is found in animal products such as milk, cheese, and meat. Limiting these foods may help you lose weight and also lower your risk for heart disease. · Do not smoke. Smoking increases your risk for heart attack and stroke. If you need help quitting, talk to your doctor about stop-smoking programs and medicines. These can increase your chances of quitting for good. When should you call for help? Call 911 anytime you think you may need emergency care. This may mean having symptoms that suggest that your blood pressure is causing a serious heart or blood vessel problem. Your blood pressure may be over 180/110. For example, call 911 if: 
· You have symptoms of a heart attack. These may include: ¨ Chest pain or pressure, or a strange feeling in the chest. 
¨ Sweating. ¨ Shortness of breath. ¨ Nausea or vomiting. ¨ Pain, pressure, or a strange feeling in the back, neck, jaw, or upper belly or in one or both shoulders or arms. ¨ Lightheadedness or sudden weakness. ¨ A fast or irregular heartbeat. · You have symptoms of a stroke. These may include: 
¨ Sudden numbness, tingling, weakness, or loss of movement in your face, arm, or leg, especially on only one side of your body. ¨ Sudden vision changes. ¨ Sudden trouble speaking. ¨ Sudden confusion or trouble understanding simple statements. ¨ Sudden problems with walking or balance. ¨ A sudden, severe headache that is different from past headaches. · You have severe back or belly pain. Do not wait until your blood pressure comes down on its own. Get help right away. Call your doctor now or seek immediate care if: 
· Your blood pressure is much higher than normal (such as 180/110 or higher), but you don't have symptoms. · You think high blood pressure is causing symptoms, such as: ¨ Severe headache. ¨ Blurry vision. Watch closely for changes in your health, and be sure to contact your doctor if: 
· Your blood pressure measures 140/90 or higher at least 2 times. That means the top number is 140 or higher or the bottom number is 90 or higher, or both. · You think you may be having side effects from your blood pressure medicine. · Your blood pressure is usually normal, but it goes above normal at least 2 times. Where can you learn more? Go to http://joseph-senait.info/. Enter M772 in the search box to learn more about \"High Blood Pressure: Care Instructions. \" Current as of: August 8, 2016 Content Version: 11.3 © 4648-7791 [employer]. Care instructions adapted under license by [employer] (which disclaims liability or warranty for this information). If you have questions about a medical condition or this instruction, always ask your healthcare professional. [place] [address] any warranty or liability for your use of this information. DISCHARGE SUMMARY from Nurse The following personal items are in your possession at time of discharge: 
 
Dental Appliances: Uppers Visual Aid: None Home Medications: None Jewelry: Edmar Mayo, With patient Clothing: Shirt, Shorts, Footwear, Undergarments Other Valuables: Chango Personal Items Sent to Safe:  (and BACK BRACE LOCKED UP) PATIENT INSTRUCTIONS: 
 
 
F-face looks uneven A-arms unable to move or move unevenly S-speech slurred or non-existent T-time-call 911 as soon as signs and symptoms begin-DO NOT go Back to bed or wait to see if you get better-TIME IS BRAIN. Warning Signs of HEART ATTACK Call 911 if you have these symptoms: 
? Chest discomfort. Most heart attacks involve discomfort in the center of the chest that lasts more than a few minutes, or that goes away and comes back. It can feel like uncomfortable pressure, squeezing, fullness, or pain. ? Discomfort in other areas of the upper body. Symptoms can include pain or discomfort in one or both arms, the back, neck, jaw, or stomach. ? Shortness of breath with or without chest discomfort. ? Other signs may include breaking out in a cold sweat, nausea, or lightheadedness. Don't wait more than five minutes to call [address]! Fast action can save your life. Calling 911 is almost always the fastest way to get lifesaving treatment. Emergency Medical Services staff can begin treatment when they arrive  up to an hour sooner than if someone gets to the hospital by car. The discharge information has been reviewed with the patient. The patient verbalized understanding. Discharge medications reviewed with the patient and appropriate educational materials and side effects teaching were provided. Discharge Instructions Attachments/References LUMBAR LAMINECTOMY FOR SPINAL STENOSIS : POST-OP (ENGLISH) Discharge Orders None Peer60 Announcement We are excited to announce that we are making your provider's discharge notes available to you in Peer60. You will see these notes when they are completed and signed by the physician that discharged you from your recent hospital stay. If you have any questions or concerns about any information you see in Peer60, please call the Health Information Department where you were seen or reach out to your Primary Care Provider for more information about your plan of care. Introducing [facility]! [employer] introduces Peer60 patient portal. Now you can access parts of your medical record, email your doctor's office, and request medication refills online. 1. In your internet browser, go to [email or web address] 2. Click on the First Time User? Click Here link in the Sign In box. You will see the New Member Sign Up page. 3. Enter your Peer60 Access Code exactly as it appears below. You will not need to use this code after youve completed the sign-up process. If you do not sign up before the expiration date, you must request a new code. · Peer60 Access Code: [email or web address] Expires: 1/3/2018  4:17 PM 
 
4. Enter the last four digits of your Social Security Number (xxxx) and Date of Birth (mm/dd/yyyy) as indicated and click Submit. You will be taken to the next sign-up page. 5. Create a [employer] ID. This will be your [employer] login ID and cannot be changed, so think of one that is secure and easy to remember. 6. Create a [employer] password. You can change your password at any time. 7. Enter your Password Reset Question and Answer. This can be used at a later time if you forget your password. 8. Enter your e-mail address. You will receive e-mail notification when new information is available in [address] 9. Click Sign Up. You can now view and download portions of your medical record. 10. Click the Download Summary menu link to download a portable copy of your medical information. If you have questions, please visit the Frequently Asked Questions section of the [employer] website. Remember, [employer] is NOT to be used for urgent needs. For medical emergencies, dial 911. Now available from your iPhone and Android! General Information Please provide this summary of care documentation to your next provider. Patient Signature:  ____________________________________________________________ Date:  ____________________________________________________________  
  
Dallas Maharaj Provider Signature:  ____________________________________________________________ Date:  ____________________________________________________________ More Information Lumbar Laminectomy: What to Expect at [facility] Your Recovery You can expect your back to feel stiff or sore after surgery. This should improve in the weeks after surgery. You may have trouble sitting or standing in one position for very long and may need pain medicine in the weeks after your surgery.  
Your doctor may advise you to work with a physical therapist to strengthen the muscles around your spine and trunk. You will need to learn how to lift, twist, and bend so that you do not put too much strain on your back. This care sheet gives you a general idea about how long it will take for you to recover. But each person recovers at a different pace. Follow the steps below to get better as quickly as possible. How can you care for yourself at home? Activity · Rest when you feel tired. Getting enough sleep will help you recover. · Try to walk each day. Start by walking a little more than you did the day before. Bit by bit, increase the amount you walk. Walking boosts blood flow and helps prevent pneumonia and constipation. Walking may also decrease your muscle soreness after surgery. · If advised by your doctor, you may need to avoid lifting anything that would cause excessive strain on your back. This may include a child, heavy grocery bags and milk containers, a heavy briefcase or backpack, cat litter or dog food bags, or a vacuum . · Avoid strenuous activities, such as bicycle riding, jogging, weight lifting, or aerobic exercise, until your doctor says it is okay. · Do not drive for 2 to 4 weeks after your surgery or until your doctor says it is okay. · Avoid riding in a car for more than 30 minutes at a time for 2 to 4 weeks after surgery. If you must ride in a car for a longer distance, stop often to walk and stretch your legs. · Try to change your position about every 30 minutes while sitting or standing. This will help decrease your back pain while you are healing. · You will probably need to take 4 to 6 weeks off from work. It depends on the type of work you do and how you feel. · You may have sex as soon as you feel able, but avoid positions that put stress on your back or cause pain. Diet · You can eat your normal diet. If your stomach is upset, try bland, low-fat foods like plain rice, broiled chicken, toast, and yogurt. · Drink plenty of fluids (unless your doctor tells you not to). · You may notice that your bowel movements are not regular right after your surgery. This is common. Try to avoid constipation and straining with bowel movements. You may want to take a fiber supplement every day. If you have not had a bowel movement after a couple of days, ask your doctor about taking a mild laxative. Medicines · Your doctor will tell you if and when you can restart your medicines. He or she will also give you instructions about taking any new medicines. · If you take blood thinners, such as warfarin (Coumadin), clopidogrel (Plavix), or aspirin, be sure to talk to your doctor. He or she will tell you if and when to start taking those medicines again. Make sure that you understand exactly what your doctor wants you to do. · Take pain medicines exactly as directed. ¨ If the doctor gave you a prescription medicine for pain, take it as prescribed. ¨ If you are not taking a prescription pain medicine, ask your doctor if you can take an over-the-counter medicine. · If your doctor prescribed antibiotics, take them as directed. Do not stop taking them just because you feel better. You need to take the full course of antibiotics. · If you think your pain medicine is making you sick to your stomach: 
¨ Take your medicine after meals (unless your doctor has told you not to). ¨ Ask your doctor for a different pain medicine. Incision care · If you have strips of tape on the cut (incision) the doctor made, leave the tape on for a week or until it falls off. · Wash the area daily with warm, soapy water and pat it dry. · Keep the area clean and dry. You may cover it with a gauze bandage if it weeps or rubs against clothing. Change the bandage every day. Exercise · Do back exercises as instructed by your doctor. · Your doctor may advise you to work with a physical therapist to improve the strength and flexibility of your back. this instruction, always ask your healthcare professional. [place] [address] any warranty or liability for your use of this information.

## 2017-10-05 LAB
ANION GAP SERPL CALC-SCNC: 4 MMOL/L (ref 3–18)
BUN SERPL-MCNC: 16 MG/DL (ref 7–18)
BUN/CREAT SERPL: 13 (ref 12–20)
CALCIUM SERPL-MCNC: 8 MG/DL (ref 8.5–10.1)
CHLORIDE SERPL-SCNC: 104 MMOL/L (ref 100–108)
CO2 SERPL-SCNC: 31 MMOL/L (ref 21–32)
CREAT SERPL-MCNC: 1.27 MG/DL (ref 0.6–1.3)
ERYTHROCYTE [DISTWIDTH] IN BLOOD BY AUTOMATED COUNT: 13.9 % (ref 11.6–14.5)
GLUCOSE SERPL-MCNC: 135 MG/DL (ref 74–99)
HCT VFR BLD AUTO: 34 % (ref 36–48)
HGB BLD-MCNC: 11.2 G/DL (ref 13–16)
MCH RBC QN AUTO: 32.2 PG (ref 24–34)
MCHC RBC AUTO-ENTMCNC: 32.9 G/DL (ref 31–37)
MCV RBC AUTO: 97.7 FL (ref 74–97)
PLATELET # BLD AUTO: 145 K/UL (ref 135–420)
PMV BLD AUTO: 10 FL (ref 9.2–11.8)
POTASSIUM SERPL-SCNC: 4.2 MMOL/L (ref 3.5–5.5)
RBC # BLD AUTO: 3.48 M/UL (ref 4.7–5.5)
SODIUM SERPL-SCNC: 139 MMOL/L (ref 136–145)
WBC # BLD AUTO: 8.8 K/UL (ref 4.6–13.2)

## 2017-10-05 PROCEDURE — 74011250636 HC RX REV CODE- 250/636: Performed by: PHYSICIAN ASSISTANT

## 2017-10-05 PROCEDURE — 85027 COMPLETE CBC AUTOMATED: CPT | Performed by: PHYSICIAN ASSISTANT

## 2017-10-05 PROCEDURE — 97530 THERAPEUTIC ACTIVITIES: CPT

## 2017-10-05 PROCEDURE — 65270000029 HC RM PRIVATE

## 2017-10-05 PROCEDURE — 74011250637 HC RX REV CODE- 250/637: Performed by: NURSE PRACTITIONER

## 2017-10-05 PROCEDURE — 97535 SELF CARE MNGMENT TRAINING: CPT

## 2017-10-05 PROCEDURE — 80048 BASIC METABOLIC PNL TOTAL CA: CPT | Performed by: PHYSICIAN ASSISTANT

## 2017-10-05 PROCEDURE — 97166 OT EVAL MOD COMPLEX 45 MIN: CPT

## 2017-10-05 PROCEDURE — 74011000258 HC RX REV CODE- 258: Performed by: ORTHOPAEDIC SURGERY

## 2017-10-05 PROCEDURE — 74011250636 HC RX REV CODE- 250/636: Performed by: ORTHOPAEDIC SURGERY

## 2017-10-05 PROCEDURE — 36415 COLL VENOUS BLD VENIPUNCTURE: CPT | Performed by: PHYSICIAN ASSISTANT

## 2017-10-05 PROCEDURE — 74011250637 HC RX REV CODE- 250/637: Performed by: ORTHOPAEDIC SURGERY

## 2017-10-05 RX ORDER — CARBAMAZEPINE 200 MG/1
400 TABLET, EXTENDED RELEASE ORAL EVERY 24 HOURS
Status: DISCONTINUED | OUTPATIENT
Start: 2017-10-05 | End: 2017-10-06 | Stop reason: HOSPADM

## 2017-10-05 RX ORDER — DEXAMETHASONE SODIUM PHOSPHATE 4 MG/ML
10 INJECTION, SOLUTION INTRA-ARTICULAR; INTRALESIONAL; INTRAMUSCULAR; INTRAVENOUS; SOFT TISSUE ONCE
Status: COMPLETED | OUTPATIENT
Start: 2017-10-05 | End: 2017-10-05

## 2017-10-05 RX ORDER — CARBAMAZEPINE 100 MG/1
200 TABLET, EXTENDED RELEASE ORAL DAILY
Status: DISCONTINUED | OUTPATIENT
Start: 2017-10-05 | End: 2017-10-06 | Stop reason: HOSPADM

## 2017-10-05 RX ADMIN — OXYCODONE HYDROCHLORIDE AND ACETAMINOPHEN 2 TABLET: 10; 325 TABLET ORAL at 14:24

## 2017-10-05 RX ADMIN — HYDROMORPHONE HYDROCHLORIDE 2 MG: 2 INJECTION INTRAMUSCULAR; INTRAVENOUS; SUBCUTANEOUS at 19:27

## 2017-10-05 RX ADMIN — HYDROMORPHONE HYDROCHLORIDE 2 MG: 2 INJECTION INTRAMUSCULAR; INTRAVENOUS; SUBCUTANEOUS at 16:24

## 2017-10-05 RX ADMIN — CARBAMAZEPINE 400 MG: 200 TABLET, EXTENDED RELEASE ORAL at 20:51

## 2017-10-05 RX ADMIN — OXYCODONE HYDROCHLORIDE AND ACETAMINOPHEN 2 TABLET: 10; 325 TABLET ORAL at 02:46

## 2017-10-05 RX ADMIN — OXYCODONE HYDROCHLORIDE AND ACETAMINOPHEN 2 TABLET: 10; 325 TABLET ORAL at 06:53

## 2017-10-05 RX ADMIN — HYDROMORPHONE HYDROCHLORIDE 2 MG: 2 INJECTION INTRAMUSCULAR; INTRAVENOUS; SUBCUTANEOUS at 12:18

## 2017-10-05 RX ADMIN — HYDROMORPHONE HYDROCHLORIDE 2 MG: 2 INJECTION INTRAMUSCULAR; INTRAVENOUS; SUBCUTANEOUS at 01:50

## 2017-10-05 RX ADMIN — HYDROMORPHONE HYDROCHLORIDE 2 MG: 2 INJECTION INTRAMUSCULAR; INTRAVENOUS; SUBCUTANEOUS at 05:36

## 2017-10-05 RX ADMIN — CEFAZOLIN SODIUM 1 G: 1 INJECTION, POWDER, FOR SOLUTION INTRAMUSCULAR; INTRAVENOUS at 10:02

## 2017-10-05 RX ADMIN — DOCUSATE SODIUM 100 MG: 100 CAPSULE, LIQUID FILLED ORAL at 09:58

## 2017-10-05 RX ADMIN — DEXAMETHASONE SODIUM PHOSPHATE 10 MG: 4 INJECTION, SOLUTION INTRAMUSCULAR; INTRAVENOUS at 10:34

## 2017-10-05 RX ADMIN — DEXTROSE MONOHYDRATE, SODIUM CHLORIDE, AND POTASSIUM CHLORIDE 100 ML/HR: 50; 4.5; 1.49 INJECTION, SOLUTION INTRAVENOUS at 10:38

## 2017-10-05 RX ADMIN — CARBAMAZEPINE 200 MG: 100 TABLET, EXTENDED RELEASE ORAL at 15:25

## 2017-10-05 RX ADMIN — Medication 10 ML: at 07:43

## 2017-10-05 RX ADMIN — Medication 10 ML: at 22:41

## 2017-10-05 RX ADMIN — CEFAZOLIN SODIUM 1 G: 1 INJECTION, POWDER, FOR SOLUTION INTRAMUSCULAR; INTRAVENOUS at 01:55

## 2017-10-05 RX ADMIN — DEXTROSE MONOHYDRATE, SODIUM CHLORIDE, AND POTASSIUM CHLORIDE 100 ML/HR: 50; 4.5; 1.49 INJECTION, SOLUTION INTRAVENOUS at 20:49

## 2017-10-05 RX ADMIN — HYDROMORPHONE HYDROCHLORIDE 2 MG: 2 INJECTION INTRAMUSCULAR; INTRAVENOUS; SUBCUTANEOUS at 22:37

## 2017-10-05 RX ADMIN — LISINOPRIL 40 MG: 20 TABLET ORAL at 17:37

## 2017-10-05 RX ADMIN — ACETAMINOPHEN 650 MG: 325 TABLET, FILM COATED ORAL at 21:00

## 2017-10-05 RX ADMIN — HYDROMORPHONE HYDROCHLORIDE 2 MG: 2 INJECTION INTRAMUSCULAR; INTRAVENOUS; SUBCUTANEOUS at 09:00

## 2017-10-05 RX ADMIN — CARVEDILOL 6.25 MG: 6.25 TABLET, FILM COATED ORAL at 17:37

## 2017-10-05 RX ADMIN — DOCUSATE SODIUM 100 MG: 100 CAPSULE, LIQUID FILLED ORAL at 17:37

## 2017-10-05 RX ADMIN — CARVEDILOL 6.25 MG: 6.25 TABLET, FILM COATED ORAL at 07:30

## 2017-10-05 NOTE — PROGRESS NOTES
Nutrition initial assessment/Plan of care      RECOMMENDATIONS:   1. Regular diet  2. Monitor weight and PO intake  3. RD to follow     GOALS:   1. PO intake meets >75% of protein/calorie needs by 10/12  2. Weight Maintenance (+/- 1-2 lb) by 10/12        ASSESSMENT:   Per BMI of 29.2, weight is in the overweight classification. However, patient with 17% weight loss in a few months which is significant. PO intake is adequate. Labs noted. Nutrition recommendations listed. RD to follow. Nutrition Diagnoses:   Unintended weight loss related to cancer as evidenced by 17% weight loss. Nutrition Risk:  [] High  [x] Moderate []  Low    SUBJECTIVE/OBJECTIVE:   Patient admitted for sponylolosthesis stenosis. He had surgery on 10/4 (s/p Lumbar decompression L4-S1 with bilateral exploration and decompression of the L4-5 and S1 nerve roots with foraminotomy and partial facetectomy). He has hx of HTN, Afib, asthma, ADD, Renal cell cancer and lumbar stenosis. He states that he weight was 238 lb a \"few months ago\". He states that he was eating about the same but he lost weight vs his cancer. 100% intake of his breakfast this am. He denies N/V. Patient states that he has been seeing a dietitian where he got his cancer treatment. He doesn't take any dietary supplement.        Information Obtained from:    [x] Chart Review   [x] Patient   [x] Family/Caregiver   [] Nurse/Physician   [] Interdisciplinary Meeting/Rounds    Diet: Regular  Medications: [x] Reviewed  (Decadron, Colace)  Allergies: [x] Reviewed   Past Medical History:   Diagnosis Date    Arrhythmia     a fib    Arthritis     Asthma     Hypertension     Insomnia     [facility] cell [facility] ([facility] [address]     Seizures ([facility] [address] 2002    Traumatic brain injury, closed ([facility] [address] 1999      Labs:  Lab Results   Component Value Date/Time    Sodium 135 08/13/2017 04:25 AM    Potassium 4.0 08/13/2017 04:25 AM    Chloride 99 08/13/2017 04:25 AM    CO2 25 08/13/2017 04:25 AM    Anion gap 11 08/13/2017 04:25 AM    Glucose 87 08/13/2017 04:25 AM    BUN 13 08/13/2017 04:25 AM    Creatinine 1.16 08/13/2017 04:25 AM    Calcium 8.4 08/13/2017 04:25 AM     Anthropometrics: BMI (calculated): 29.2  Last 3 Recorded Weights in this Encounter    09/21/17 1237 10/04/17 0649   Weight: 85.3 kg (188 lb) 89.8 kg (198 lb)    Ht Readings from Last 1 Encounters:   10/04/17 5' 9\" (1.753 m)     Patient Vitals for the past 100 hrs:   % Diet Eaten   10/05/17 0902 100 %   10/04/17 1345 100 %       IBW: 145 lb %IBW: 137% UBW: 238 lb %UBW: 83%   [x] Weight Loss [] Weight Gain [] Weight Stable    Estimated Nutrition Needs: [x] MSJ  [] Other:  Calories: 7741-5264 kcal Based on:   [x] Actual BW    Protein:    g Based on:   [x] Actual BW    Fluid:       0102-8673 ml Based on:   [x] Actual BW      [x] No Cultural, Orthodoxy or ethnic dietary need identified.     [] Cultural, Orthodoxy and ethnic food preferences identified and addressed     Wt Status:  [] Normal (18.6 - 24.9) [] Underweight (<18.5) [x] Overweight (25 - 29.9) [] Mild Obesity (30 - 34.9)  [] Moderate Obesity (35 - 39.9) [] Morbid Obesity (40+)   [] Moderate Malnutrition [] Severe Malnutrition in the context of :     Nutrition Problems Identified:   [] Suboptimal PO intake   [] Food Allergies  [] Difficulty chewing/swallowing/poor dentition  [] Constipation/Diarrhea   [] Nausea/Vomiting   [x] None  [] Other:     Plan:   [] Therapeutic Diet  []  Obtained/adjusted food preferences/tolerances and/or snacks options   []  Supplements added   [] Occupational therapy following for feeding techniques  []  HS snack added   []  Modify diet texture   []  Modify diet for food allergies   []  Educate patient   []  Assist with menu selection   [x]  Monitor PO intake on meal rounds   [x]  Continue inpatient monitoring and intervention   []  Participated in discharge planning/Interdisciplinary rounds/Team meetings   []  Other:     Education Needs:   [] Not appropriate for teaching at this time due to:   [x] Identified and addressed    Nutrition Monitoring and Evaluation:  [x] Continue ongoing monitoring and intervention  [] Other    Elo Mukherjee, VIDYA  Pager: [phone]

## 2017-10-05 NOTE — DISCHARGE SUMMARY
Discharge Summary    Admit Date: 10/4/2017  Discharge Date:  10/6/17    Patient ID:   Name:  Saranya Morris      Age:  55 y.o.    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 Patient is not currently contagious. Physical Exam on Discharge:  Visit Vitals    /63 (BP 1 Location: Left arm, BP Patient Position: Lying right side)    Pulse 81    Temp 98.5 °F (36.9 °C)    Resp 18    Ht 5' 9\" (1.753 m)    Wt 89.8 kg (198 lb)    SpO2 98%    BMI 29.24 kg/m2         General: in no apparent distress   Extremities:  Neurovascular intact    Dressing:  Dry   DVT Exam:   No evidence of DVT seen on physical exam;  compartments soft and NT. Relevant labs within last 72 hours:    CBC w/Diff    Lab Results   Component Value Date/Time    WBC 8.8 10/05/2017 10:00 AM    RBC 3.48 (L) 10/05/2017 10:00 AM    HCT 34.0 (L) 10/05/2017 10:00 AM    MCV 97.7 (H) 10/05/2017 10:00 AM    MCH 32.2 10/05/2017 10:00 AM    MCHC 32.9 10/05/2017 10:00 AM    RDW 13.9 10/05/2017 10:00 AM    Lab Results   Component Value Date/Time    RDW 13.9 10/05/2017 10:00 AM          BMP   Lab Results   Component Value Date     10/05/2017    CO2 31 10/05/2017    BUN 16 10/05/2017          Coagulation   No results found for: INR, APTT           Condition at discharge: Afebrile  Ambulating  Eating, Drinking, Voiding  Stable    Current Discharge Medication List      CONTINUE these medications which have NOT CHANGED    Details   tapentadol (NUCYNTA) 75 mg tablet Take 75 mg by mouth four (4) times daily.       LIVALO 2 mg tablet TK 1 T PO D  Refills: 5      lidocaine (XYLOCAINE) 2 % jelly GIORGI SML AMT EXT AA UP TO QID PRF PAIN  Refills: 0      diazePAM (VALIUM) 10 mg tablet TK 1 AND 1/2 T PO QHS PRN  Refills: 1      gabapentin (NEURONTIN) 300 mg capsule TK 1 C PO TID  Refills: 2      dextroamphetamine-amphetamine (ADDERALL) 10 mg tablet Take 15 mg by mouth three (3) times daily. albuterol (VENTOLIN HFA) 90 mcg/actuation inhaler Take 2 Puffs by inhalation every four (4) hours as needed for Wheezing. CARBAMAZEPINE (CARBATROL PO) Take 200 mg by mouth two (2) times a day. 1 in morning and 2 at 1600      CARVEDILOL PO Take 6.25 mg by mouth two (2) times a day. LISINOPRIL PO Take 40 mg by mouth daily. multivitamin (ONE A DAY) tablet Take 1 Tab by mouth daily. docusate sodium (COLACE) 100 mg capsule Take 1 Cap by mouth two (2) times daily as needed for Constipation for up to 90 days. Qty: 60 Cap, Refills: 2      cyclobenzaprine (FLEXERIL) 10 mg tablet TK 1 T PO TID  Refills: 1         STOP taking these medications       APIXABAN (ELIQUIS PO) Comments:   Reason for Stopping:                 PCP:  Luis Antonio Brody NP        Disposition:  Clear for discharge to home, if clear by medicine service. Follow-up in the office in  1 month with Dr. Trevino Sessions; call [phone] to schedule appointment.      Wound Care: Dressing down POD #5    DVT prophylaxis: Resume Eliquis POD 5          -Claudette Reilly PA-C  10/5/2017  Office [phone]  Cell [phone]

## 2017-10-05 NOTE — ACP (ADVANCE CARE PLANNING)
Patient has designated ________________________ to participate in his/her discharge plan and to receive any needed information.      Name:   Address:  Phone number:    Roopa Rodriguez

## 2017-10-05 NOTE — PROGRESS NOTES
[facility]  Hospitalist Division        Inpatient Daily Progress Note    Daily progress Note    Patient: Carmen Hannah MRN: [id number]  CSN: [id number]    YOB: 1960  Age: 55 y.o. Sex: male    DOA: 10/4/2017 LOS:  LOS: 1 day                    Chief Complaint: Back pain       Subjective:      C/o sleepless night. Incisional soreness     Objective:      Visit Vitals    /68    Pulse 87    Temp 96.9 °F (36.1 °C)    Resp 16    Ht 5' 9\" (1.753 m)    Wt 89.8 kg (198 lb)    SpO2 93%    BMI 29.24 kg/m2           Physical Exam:  General appearance: alert, cooperative, no distress, appears stated age  Lungs: clear to auscultation throughout   Heart: regular rate and rhythm, S1, S2 normal, no murmur, click, rub or gallop  Abdomen: soft, non tender, non distended. Normoactive bowel sounds  Extremities: extremities normal, atraumatic, no cyanosis or edema.  BLE compartments soft, non tender   Skin: Skin color, texture, turgor normal. No rashes or lesions  Neurologic: Grossly normal  PSY: appropriately behaved      Intake and Output:  Current Shift:  10/05 0701 - 10/05 1900  In: 240 [P.O.:240]  Out: -   Last three shifts:  10/03 1901 - 10/05 0700  In: 4998.3 [P.O.:2380; I.V.:2618.3]  Out: 5225 [Urine:4890]    Recent Results (from the past 24 hour(s))   CBC W/O DIFF    Collection Time: 10/05/17 10:00 AM   Result Value Ref Range    WBC 8.8 4.6 - 13.2 K/uL    RBC 3.48 (L) 4.70 - 5.50 M/uL    HGB 11.2 (L) 13.0 - 16.0 g/dL    HCT 34.0 (L) 36.0 - 48.0 %    MCV 97.7 (H) 74.0 - 97.0 FL    MCH 32.2 24.0 - 34.0 PG    MCHC 32.9 31.0 - 37.0 g/dL    RDW 13.9 11.6 - 14.5 %    PLATELET 026 079 - 577 K/uL    MPV 10.0 9.2 - 23.1 FL   METABOLIC PANEL, BASIC    Collection Time: 10/05/17 10:00 AM   Result Value Ref Range    Sodium 139 136 - 145 mmol/L    Potassium 4.2 3.5 - 5.5 mmol/L    Chloride 104 100 - 108 mmol/L    CO2 31 21 - 32 mmol/L    Anion gap 4 3.0 - 18 mmol/L Glucose 135 (H) 74 - 99 mg/dL    BUN 16 7.0 - 18 MG/DL    Creatinine 1.27 0.6 - 1.3 MG/DL    BUN/Creatinine ratio 13 12 - 20      GFR est AA >60 >60 ml/min/1.73m2    GFR est non-AA 59 (L) >60 ml/min/1.73m2    Calcium 8.0 (L) 8.5 - 10.1 MG/DL           Lab Results   Component Value Date/Time    Glucose 135 10/05/2017 10:00 AM    Glucose 87 08/13/2017 04:25 AM    Glucose 122 08/11/2017 06:00 AM        Assessment/Plan:     Patient Active Problem List   Diagnosis Code    Right renal mass N28.89    ADD (attention deficit disorder) F98.8    Chronic left-sided back pain M54.9, G89.29    DDD (degenerative disc disease), thoracolumbar M51.35    Essential hypertension I10    H/O traumatic brain injury Z87.820    High risk medications (not anticoagulants) long-term use Z79.899    Paroxysmal atrial fibrillation (HCC) I48.0    Primary insomnia F51.01    Seizures (HCC) R56.9    Renal mass N28.89    Renal cell cancer (HCC) C64.9    Lumbar stenosis M48.061       A/P:  - S/p Lumbar decompression L4-S1 with bilateral exploration and decompression of the L4-5 and S1 nerve roots with foraminotomy and  partial facetectomy: mobilization per Ortho   - Afib: resume Eliquis when ok with Ortho   - HTN: Coreg, Lisinopril  -Asthma: Albuterol PRN   - Hx seizure: resume Tegretol   - DVT prophylaxis: SCDs       JOLENE Beckett-BC  [address]  Hospitalist Division  Pager:  [phone]  Office:  [phone]

## 2017-10-05 NOTE — PROGRESS NOTES
Progress Note    Addendum: Patient now wishes to be discharged today    Post Operative Day: 1    Assessment:    1. 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 PLAN:    1. Mobilize. Continue P.T.  2. Brace  3. One dose of IV decadron for LLE pain  4. Discharge Planning home tomorrow           HPI: Barb Adhikari is a 55 y.o. 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 Complaining of severe LLE pain    Blood pressure 113/68, pulse 87, temperature 96.9 °F (36.1 °C), resp. rate 16, height 5' 9\" (1.753 m), weight 89.8 kg (198 lb), SpO2 95 %. CBC w/Diff   Lab Results   Component Value Date/Time    WBC 5.0 08/13/2017 04:25 AM    RBC 3.71 (L) 08/13/2017 04:25 AM    HCT 34.5 (L) 08/13/2017 04:25 AM          Physical Assessment:  General: in no apparent distress   Extremities:  Motors intact. Decreased sensation left lateral calf to fott   Dressing:  Dry   DVT Exam:   No exam evidence to suggest DVT.  Compartments soft and NT.               Felipe Reilly PA-C  10/5/2017  Office [phone]  Cell [phone]

## 2017-10-05 NOTE — PROGRESS NOTES
Care Management Interventions  PCP Verified by CM: Yes  Mode of Transport at Discharge:  Other (see comment) (friend/family)  Transition of Care Consult (CM Consult): [address]: No  [facility] Out of service area ([place])  Discharge Durable Medical Equipment: Yes (Rolling walker)  Physical Therapy Consult: Yes  Occupational Therapy Consult: Yes  Speech Therapy Consult: No  Current Support Network: Lives Alone  Confirm Follow Up Transport: Self  Plan discussed with Pt/Family/Caregiver: Yes  Freedom of Choice Offered: Yes  Discharge Location  Discharge Placement: Home with home health

## 2017-10-05 NOTE — PROGRESS NOTES
Problem: Mobility Impaired (Adult and Pediatric)  Goal: *Acute Goals and Plan of Care (Insert Text)  Physical Therapy Goals  Initiated 10/4/2017 and to be accomplished within 7 day(s)  1. Patient will move from supine to sit and sit to supine , scoot up and down and roll side to side in bed with modified independence. 2. Patient will transfer from bed to chair and chair to bed with modified independence using the least restrictive device. 3. Patient will perform sit to stand with modified independence. 4. Patient will ambulate with modified independence for 300 feet with the least restrictive device. 5. Patient will ascend/descend 3 stairs with handrail(s) with modified independence. Outcome: Progressing Towards Goal  PHYSICAL THERAPY TREATMENT     Patient: Tatiana Walker (55 y.o. 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 ASSESSMENT:  Pt in R side lying upon entering room. Min/modA to sit up at EOB and increased time to carry out. Attempted to void sitting EOB, pt unable. Donned LSO and ambulated into bathroom. Voided 475cc, nurse notified. Pt reports increased difficulty putting wt through LLE due to anterior/lateral cramping around hip. VC for RW mgmt and posture. Sat EOB for 15 minutes prior to returning to supine. Isael with LEs. Provided pt with more pillows for comfort. Education: UE placement to assist with bed mobility, RW mgmt and safety, lumbar precautions.   Progression toward goals:  [ ]      Improving appropriately and progressing toward goals  [X]      Improving slowly and progressing toward goals  [ ]      Not making progress toward goals and plan of care will be adjusted       PLAN:  Patient continues to benefit from skilled intervention to address the above impairments. Continue treatment per established plan of care. Discharge Recommendations:  Home Health  Further Equipment Recommendations for Discharge:  rolling walker       SUBJECTIVE:   Patient stated This leg hurts worse, that medicine is not working.       OBJECTIVE DATA SUMMARY:   Critical Behavior:  Neurologic State: Alert  Orientation Level: Oriented X4  Cognition: Follows commands  Safety/Judgement: Awareness of environment, Fall prevention  Functional Mobility Training:  Bed Mobility:  Rolling: Contact guard assistance  Supine to Sit: Minimum assistance; Moderate assistance; Additional time  Sit to Supine: Minimum assistance  Scooting: Moderate assistance  Transfers:  Sit to Stand: Contact guard assistance; Additional time  Stand to Sit: Stand-by asssistance  Bed to Chair: Minimum assistance; Additional time  Balance:  Sitting: Intact; With support  Sitting - Static: Good (unsupported)  Sitting - Dynamic: Fair (occasional)  Standing: Impaired; With support  Standing - Static: Fair  Standing - Dynamic : Fair  Ambulation/Gait Training:  Distance (ft): 20 Feet (ft)  Assistive Device: Brace/Splint;Gait belt;Walker, rolling  Ambulation - Level of Assistance: Stand-by asssistance  Gait Abnormalities: Antalgic;Decreased step clearance  Speed/Su: Slow;Delayed  Step Length: Right shortened;Left shortened  Pain:  Pre:9  Post:9  Pain Scale 1: Numeric (0 - 10)     Pain Location 1: Leg  Pain Orientation 1: Left (thigh, hip)  Pain Description 1: Aching;Constant;Cramping     Activity Tolerance:   Fair(-)  Please refer to the flowsheet for vital signs taken during this treatment.   After treatment:   [ ] Patient left in no apparent distress sitting up in chair  [X] Patient left in no apparent distress in bed  [X] Call bell left within reach  [ ] Nursing notified  [ ] Caregiver present  [ ] Bed alarm activated      [address], PTA   Time Calculation: 32 mins

## 2017-10-05 NOTE — PROGRESS NOTES
Problem: Self Care Deficits Care Plan (Adult)  Goal: *Acute Goals and Plan of Care (Insert Text)  Occupational Therapy Goals  Initiated 10/5/2017 within 7 day(s). 1. Patient will perform lower body dressing with modified independence utilizing AE, prn.  2. Patient will perform grooming tasks while standing with supervision with good safety awareness. 3. Patient will perform functional task while standing for 8 minutes with supervision to increase activity tolerance for ADLs. 4. Patient will perform toilet transfers with supervision/set-up. 5. Patient will perform all aspects of toileting with supervision/set-up. 6. Patient will participate in upper extremity therapeutic exercise/activities with supervision/set-up for 8 minutes . 7. Patient will utilize energy conservation techniques during functional activities with minimal verbal cues. Outcome: Progressing Towards Goal  OCCUPATIONAL THERAPY EVALUATION     Patient: Cookie Uribe (55 y.o. 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 Pt sidelying on arrival, c/o 10/10 pain, agreeable to sit at EOB in prep for pain meds. Demanding, hostile, & verbally aggressive t/o session. Educated on lumbar precautions; needs reinforcement. Mod A/additional time to maneuver to EOB. Jose Black RN present and administering pain meds. Max A to don LSO. Supervision & CGA/min A for ADLs at EOB secondary to fair sitting balance. Min/mod A for functional transfers. CGA to maneuver to chair in prep for toilet transfer. Pt left in recliner chair with needs within reach; drowsy at end of session. Recommend continued therapy during acute care stay. Patient will benefit from skilled intervention to address the above impairments. Patients rehabilitation potential is considered to be Good  Factors which may influence rehabilitation potential include:   [ ]             None noted  [ ]             Mental ability/status  [X]             Medical condition  [ ]             Home/family situation and support systems  [ ]             Safety awareness  [ ]             Pain tolerance/management  [ ]             Other:      Recommendations for nursing: assist x1 with RW  Verbally communicated to: Amy Barbour RN          PLAN :  Recommendations and Planned Interventions:  [X]               Self Care Training                  [X]        Therapeutic Activities  [X]               Functional Mobility Training    [ ]        Cognitive Retraining  [X]               Therapeutic Exercises           [X]        Endurance Activities  [X]               Balance Training                   [ ]        Neuromuscular Re-Education  [ ]               Visual/Perceptual Training     [X]   Home Safety Training  [X]               Patient Education                 [X]        Family Training/Education  [ ]               Other (comment):     Frequency/Duration: Patient will be followed by occupational therapy 4-7 times a week to address goals. Discharge Recommendations: Rehab vs. Home Health  Further Equipment Recommendations for Discharge: bedside commode, shower chair       SUBJECTIVE:   Patient stated I don't know what they did but my left leg has been killing me. \"      OBJECTIVE DATA SUMMARY:       Past Medical History:   Diagnosis Date    Arrhythmia       a fib    Arthritis      Asthma      Hypertension      Insomnia      [facility]      Seizures ([facility] 2002    Traumatic brain injury, closed ([facility] [address] [facility] 1999     Past Surgical History:   Procedure Laterality Date    HX ANKLE FRACTURE TX        HX CERVICAL FUSION   2001     Had to be removed    HX HERNIA REPAIR        HX KNEE ARTHROSCOPY        HX LUMBAR FUSION   2001     Had to be removed     HX NEPHRECTOMY   08/10/2017     Hand-assisted laparoscopic right nephrectomy. Dr. Malvin Cervantes [address]   01/2017     L4-S1--     NEUROLOGICAL PROCEDURE UNLISTED         BACK & NECK SX     Barriers to Learning/Limitations: None  Compensate with: visual, verbal, tactile, kinesthetic cues/model     GCODES:  Self Care  Current  CL= 60-79%   Goal  CJ= 20-39%. The severity rating is based on the Other Functional Assessment, MMT, ROM     Eval Complexity: History: MEDIUM Complexity : Expanded review of history including physical, cognitive and psychosocial  history ; Examination: MEDIUM Complexity : 3-5 performance deficits relating to physical, cognitive , or psychosocial skils that result in activity limitations and / or participation restrictions; Decision Making:MEDIUM Complexity : Patient may present with comorbidities that affect occupational performnce. Miniml to moderate modification of tasks or assistance (eg, physical or verbal ) with assesment(s) is necessary to enable patient to complete evaluation      Prior Level of Function/Home Situation:   Home Situation  Home Environment: Private residence  # Steps to Enter: 3  One/Two Story Residence: Two story  Living Alone: Yes  Support Systems: None  Patient Expects to be Discharged to[de-identified] Private residence  Current DME Used/Available at Home: Cane, straight  [X]  Right hand dominant          [ ]  Left hand dominant     Cognitive/Behavioral Status:  Neurologic State: Alert  Orientation Level: Oriented X4  Cognition: Follows commands  Safety/Judgement: Awareness of environment; Fall prevention      Skin: Intact (BUEs)  Edema: None noted (BUEs)     Vision/Perceptual:    Acuity: Within Defined Limits       Coordination:  Coordination: Within functional limits (BUEs)  Fine Motor Skills-Upper: Right Intact; Left Intact    Gross Motor Skills-Upper: Right Intact; Left Intact      Balance:  Sitting: Impaired  Sitting - Static: Fair (occasional)  Sitting - Dynamic: Fair (occasional)  Standing: Impaired; With support  Standing - Static: Fair  Standing - Dynamic : Fair      Strength:  Strength: Within functional limits (BUEs: 5/5)     Tone & Sensation:  Tone: Normal (BUEs)  Sensation: Intact (BUEs)     Range of Motion:  AROM: Within functional limits (BUEs: full shoulder/elbow flex)  PROM: Within functional limits (BUEs)     Functional Mobility and Transfers for ADLs:  Bed Mobility:  Rolling: Minimum assistance; Additional time  Supine to Sit: Moderate assistance; Additional time;Assist x1  Sit to Supine:  (pt left up in chair)     Transfers:  Sit to Stand: Minimum assistance; Moderate assistance; Additional time;Assist x1  Bed to Chair: Minimum assistance; Additional time              Toilet Transfer :  (not assessed; pt refused)     ADL Assessment:  Feeding: Setup;Modified independent  Oral Facial Hygiene/Grooming: Setup;Modified Independent  Bathing: Maximum assistance  Upper Body Dressing: Setup;Supervision  Lower Body Dressing: Maximum assistance  Toileting: Minimum assistance     ADL Intervention:  Grooming  Grooming Assistance: Supervision/set up  Washing Face: Supervision/set-up  Washing Hands: Contact guard assistance (for balance)  Cues: Physical assistance;Verbal cues provided     Upper Body Bathing  Bathing Assistance: Minimum assistance  Position Performed: Seated edge of bed  Cues: Verbal cues provided;Physical assistance     CGA to wash hands at EOB (secondary to decreased sitting balance without support). Supervision/set-up for facial hygiene. Min A for UB bathing due to impaired sitting balance. Cognitive Retraining  Safety/Judgement: Awareness of environment; Fall prevention     Therapeutic Activity:  Pt performed sitting task at EOB for ~15 mins in prep for ADLs with fair balance (constant support from [address]). Provided skilled instruction for positioning and pacing to increase independence in sitting. Functional transfer from EOB to chair in prep for toilet transfer with CGA/min A and additional time; good safety awareness noted. Pain:  Pre treatment pain level: 10/10 Barry Feldman RN administered pain meds)  Post treatment pain level: 10/10  Pain Scale 1: Numeric (0 - 10)  Pain Intensity 1: 10  Pain Location 1: Leg  Pain Orientation 1: Left  Pain Description 1: Aching;Constant;Cramping     Activity Tolerance:  Fair  Please refer to the flowsheet for vital signs taken during this treatment. After treatment:   [X] Patient left in no apparent distress sitting up in chair  [ ] Patient left in no apparent distress in bed  [X] Call bell left within reach  [X] Nursing notified  [ ] Caregiver present   [ ] Bed alarm activated      COMMUNICATION/EDUCATION: Pt educated on role of OT, POC, home safety. He verbalized understanding.   [X] Home safety education was provided and the patient/caregiver indicated understanding. [X] Patient/family have participated as able in goal setting and plan of care. [X] Patient/family agree to work toward stated goals and plan of care. [ ] Patient understands intent and goals of therapy, but is neutral about his/her participation. [ ] Patient is unable to participate in goal setting and plan of care. Thank you for this referral.     Parveen Chase MS OTR/L  Time Calculation: 40 mins       [address]     DO NOT TWIST your back. DO NOT BEND to  objects. Use the Squat Lift method or use a *REACHER STICK. Get out of bed using the Log Roll method. DO NOT LIFT more than 5 pounds until cleared by your physician. DO NOT RIDE in a car except to go home from the hospital or to see your physician. DO NOT DRIVE until cleared by your physician.      Limit sitting to less than one hour at a time. Use a long handled back brush/sponge to wash your feet if you cannot reach them. Squat or sit on a chair when removing items from the refrigerator. Put all frequently used kitchen items within easy reach. Sit to put on pants, socks, and shoes. Do not perform lower body dressing while standing up. Carry items close to your body. If needed, sit on a shower chair and use an extended hand-held shower for bathing. Do not shower until cleared by physician. Conserve energy by pacing  yourself during your daily activities. *Reachers are available at local drug stores for about $10 - $15 or can be ordered from a catalog provided by the therapy department. In drug stores they are often sold  under the name of Toi Mcmahon. 

## 2017-10-05 NOTE — PROGRESS NOTES
[facility]   Discharge Planning/ Assessment    Reasons for Intervention: Chart reviewed. Met with pt., verified all demographics. [facility] has [facility] [place]. States see Sylvia Mccartney for medical care. Cris Reyna, brother: [phone]. Lives alone. Has cane. Pt drove himself here to hospital, asked him who will be driving him home, states not sure. Asked him about SNF for rehab, he refuses, would like home health & walker, will need orders, FYI left for MD.  PLAN: home with home health when medically stable. Will cont to follow for further needs. Tamara DavisRN,ext. [phone].      High Risk Criteria  [] Yes  [x]No   Physician Referral  [] Yes  [x]No        Date    Nursing Referral  [] Yes  [x]No        Date    Patient/Family Request  [] Yes  [x]No        Date       Resources:    Medicare  [x] Yes  []No   Medicaid  [x] Yes  []No   No Resources  [] Yes  [x]No   Private Insurance  [] Yes  [x]No    Name/Phone Number    Other  [] Yes  [x]No        (i.e. Workman's Comp)         Prior Services:    Prior Services  [] Yes  [x]No   Home Health  [] Yes  [x]No   [address]  [] Yes  [x]No        [facility] of [address] Program  [] Yes  [x]No       Meals on Wheels  [] Yes  [x]No   Office on Aging  [] Yes  [x]No   Transportation Services  [] Yes  [x]No   Nursing Home  [] Yes  [x]No        Nursing Home Name    [address]  [] Yes  [x]No        [address] Name    Other       Information Source:      Information obtained from  [x] Patient  [] Parent   [] [address]  [] Child  [] Spouse   [] Significant Other/Partner   [] Friend      [] EMS    [] Nursing Home Chart          [] Other:   Chart Review  [x] Yes  []No     Family/Support System:    Patient lives with  [x] Alone    [] Spouse   [] Significant Other  [] Children  [] Caretaker   [] Parent  [] Sibling     [] Other       Other Support System:    Is the patient responsible for care of others  [] Yes  [x]No   Information of person caring for patient on  discharge    Managers financial affairs independently  [x] Yes  []No   If no, explain:      Status Prior to Admission:    Mental Status  [x] Awake  [x] Alert  [x] Oriented  [x] Quiet/Calm [] Lethargic/Sedated   [] Disoriented  [] Restless/Anxious  [] Combative   Personal Care  [] Dependent  [x] [address]  [] Requires Assistance   Meal Preparation Ability  [x] Independent   [] Standby Assistance   [] Minimal Assistance   [] Moderate Assistance  [] Maximum Assistance     [] Total Assistance   Chores  [x] Independent with Chores   [] N/A Nursing Home Resident   [] Requires Assistance   Bowel/Bladder  [x] Continent  [] Catheter  [] Incontinent  [] Ostomy Self-Care    [] Urine Diversion Self-Care  [] Maximum Assistance     [] Total Assistance   Number of Persons needed for assistance    DME at home  [] Ceci Bach  [x] Terrell Bach   [] Commode    [] Bathroom/Grab Bars  [] Hospital Bed  [] Nebulizer  [] Oxygen           [] Raised Toilet Seat  [] Shower Chair  [] Side Rails for Bed   [] Tub Transfer Bench   [] Josef Mario  [] Romeroe Locket, Standard      [] Other:   Vendor      Treatment Presently Receiving:    Current Treatments  [] Chemotherapy  [] Dialysis  [] Insulin  [] IVAB [x] IVF   [] O2  [] PCA   [x] PT   [] RT   [] Tube Feedings   [] Wound Care     Psychosocial Evaluation:    Verbalized Knowledge of Disease Process  [] Patient  []Family   Coping with Disease Process  [] Patient  []Family   Requires Further Counseling Coping with Disease Process  [] Patient  []Family     Identified Projected Needs:    Home Health Aid  [] Yes  [x]No   Transportation  [] Yes  [x]No   Education  [] Yes  [x]No        Specific Education     Financial Counseling  [] Yes  [x]No   Inability to Care for Self/Will Require 24 hour care  [] Yes  [x]No   Pain Management  [] Yes  [x]No   Home Infusion Therapy  [] Yes  [x]No   Oxygen Therapy  [] Yes  [x]No   DME  [x] Yes []No   Long Term Care Placement  [] Yes  [x]No   Rehab  [] Yes  [x]No   Physical Therapy  [x] Yes  []No   Needs Anticipated At This Time  [x] Yes  []No     Intra-Hospital Referral:    [facility]  [] Yes  [x]No     [] Yes  [x]No   Patient Representative  [] Yes  [x]No   Staff for Teaching Needs  [] Yes  [x]No   Specialty Teaching Needs     Diabetic Educator  [] Yes  [x]No   Referral for Diabetic Educator Needed  [] Yes  [x]No  If Yes, place order for Nutritionist or Diabetic Consult     Tentative Discharge Plan:    Home with No Services  [] Yes  [x]No   Home with Home Health Follow-up  [x] Yes  []No        If Yes, specify type    Home Care Program  [] Yes  [x]No        If Yes, specify type    Meals on Wheels  [] Yes  [x]No   Office of Aging  [] Yes  [x]No   NHP  [] Yes  [x]No   Return to the Nursing Home  [] Yes  [x]No   Rehab Therapy  [] Yes  [x]No   Acute Rehab  [] Yes  [x]No   Subacute Rehab  [] Yes  [x]No   Private Care  [] Yes  [x]No   Substance Abuse Referral  [] Yes  [x]No   Transportation  [] Yes  [x]No   Chore Service  [] Yes  [x]No   Inpatient Hospice  [] Yes  [x]No   OP RT  [] Yes  [x] No   OP Hemo  [] Yes  [x] No   OP PT  [] Yes  [x]No   Support Group  [] Yes  [x]No   Reach to Recovery  [] Yes  [x]No   OP Oncology Clinic  [] Yes  [x]No   Clinic Appointment  [] Yes  [x]No   DME  [] Yes  [x]No   Comments    Name of D/C Planner or  Given to Patient or Family Manny Benitez   Phone Number Pager: [phone]  [phone]   Date 10-5-2017   Time    If you are discharged home, whom do you designate to participate in your discharge plan and receive any information needed?      Enter name of designee         Phone # of designee         Address of designee         Updated         Patient refused to designate any           individual Declined

## 2017-10-05 NOTE — PROGRESS NOTES
Offered the pt [facility] for home care, he chose [facility] # [phone]. Pt verified the contact information on the face sheet is correct. Sent the referral to them via [address] as a pending dc. Delivered the pt a rolling walker fom the [employer]. Sent the referral and signed delivery ticket to [facility] for processing. Notified [facility] intake rep, Steph Martin, the pt is dc'd home this afternoon.

## 2017-10-05 NOTE — PROGRESS NOTES
9323 - received pt in room. Pt is AO. Pain rated 10/10 to left hip/thigh. 5878 - assessment completed. Pt is AOx4. VSS. IV fluid infusing. Pt is sitting up in bed ready to work with PT. Call bell placed at bedside. No distress noted. 1003 - observed pt up in chair, no distress noted. 1218 - pt laying on his side in bed eating. Pain medicine administered. 1320 - observed pt ambulating in room with PT. Pt able to void after gerber d/c'd    1530 - pt upset when I attempted to explain his mediction carbamazepine is the same as he is currently taking. Pt stated that he has the medication in his car and I advised that I cannot approve him going out to his car for the medication. Pt finally agreed to take, \"okay I'll take it, I'm tired of the BS, that's why I left the last time. \" I advised pt that he does not have to take it and that I'll have a pharmacist come and explain any differences and concerns, \"no I'll just take it and what about my nucynta? I was told by a nurse that I would be receiving all of my medications. If I didn't have problems with this leg, I would walk right out. \"    1600 - spoke to NP [employer] about pain medication, in house pharmacy does not have this medication available. [phone] - contacted LUDIN Reilly and advised her that I will be cancelling pt's discharge. Pt stated, \"I can't go home like this, I'll just speak to the doctor tomorrow. \"    3951 - pt resting in bed, watching TV. No distress noted.

## 2017-10-05 NOTE — PROGRESS NOTES
Problem: Mobility Impaired (Adult and Pediatric)  Goal: *Acute Goals and Plan of Care (Insert Text)  Physical Therapy Goals  Initiated 10/4/2017 and to be accomplished within 7 day(s)  1. Patient will move from supine to sit and sit to supine , scoot up and down and roll side to side in bed with modified independence. 2. Patient will transfer from bed to chair and chair to bed with modified independence using the least restrictive device. 3. Patient will perform sit to stand with modified independence. 4. Patient will ambulate with modified independence for 300 feet with the least restrictive device. 5. Patient will ascend/descend 3 stairs with handrail(s) with modified independence. Outcome: Progressing Towards Goal  PHYSICAL THERAPY TREATMENT     Patient: Kiko Arteaga (8 y.o. 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 ASSESSMENT:  Pt sitting in recliner requesting to return to bed. Isael to sit upright and scoot to edge of chair. C/o muscle cramps in L upper thigh and groin. Reports 9/10 pain. Ambulated 8 ft with RW from chair to bed. Doffed LSO, Isael with LEs to clear edge of mattress. Pt left in R side lying, pillow between knees. Notified nurse. Education: LSO use per Dr protocol, lumbar precautions  Progression toward goals:  [ ]      Improving appropriately and progressing toward goals  [X]      Improving slowly and progressing toward goals  [ ]      Not making progress toward goals and plan of care will be adjusted       PLAN:  Patient continues to benefit from skilled intervention to address the above impairments. Continue treatment per established plan of care.   Discharge Recommendations:  Home Health  Further Equipment Recommendations for Discharge:  rolling walker       SUBJECTIVE:   Patient stated [facility] can't I have surgery and be fine.       OBJECTIVE DATA SUMMARY:   Critical Behavior:  Neurologic State: Alert  Orientation Level: Oriented X4  Cognition: Follows commands  Safety/Judgement: Awareness of environment, Fall prevention  Functional Mobility Training:  Bed Mobility:  Rolling: Minimum assistance; Additional time  Supine to Sit: Moderate assistance; Additional time;Assist x1  Sit to Supine: Minimum assistance (with LEs)  Scooting: Additional time  Transfers:  Sit to Stand: Contact guard assistance; Additional time  Stand to Sit: Stand-by asssistance  Bed to Chair: Minimum assistance; Additional time  Balance:  Sitting: Intact  Sitting - Static: Fair (occasional)  Sitting - Dynamic: Fair (occasional)  Standing: Impaired; With support  Standing - Static: Fair  Standing - Dynamic : Fair  Ambulation/Gait Training:  Distance (ft): 8 Feet (ft)  Assistive Device: Brace/Splint;Gait belt;Walker, rolling  Ambulation - Level of Assistance: Contact guard assistance  Gait Abnormalities: Antalgic;Decreased step clearance  Speed/Su: Slow;Delayed (due to spasms)  Step Length: Right shortened;Left shortened  Pain:  Pre:9  Post:9  Pain Scale 1: Numeric (0 - 10)     Pain Location 1: Leg  Pain Orientation 1: Left  Pain Description 1: Aching;Constant;Cramping     Activity Tolerance:   Poor  Please refer to the flowsheet for vital signs taken during this treatment.   After treatment:   [ ] Patient left in no apparent distress sitting up in chair  [X] Patient left in no apparent distress in bed  [X] Call bell left within reach  [X] Nursing notified  [ ] Caregiver present  [ ] Bed alarm activated      Alisa Kaplan PTA   Time Calculation: 16 mins

## 2017-10-05 NOTE — PROGRESS NOTES
1925 Received patient from Mohit Hayward RN. Patient is alert and oriented x 4.   2300 Patient requested Anxiety medication, Called MD and furthered patients request. One time dose given. 2315 Patient hit call bell to find out if his medicine is ready, very demanding. 0300 Patient resting with eyes closed. 0500 Ambulated patient to the door of his room, Following this patient requested to sit in the chair in his room for a few minutes, x3 minutes. Assisted back to the bed.   4024 removed gerber catheter as ordered. 9501 Bedside and Verbal shift change report given to Lulla Lennox, RN (oncoming nurse) by Mayela Contreras RN (offgoing nurse). Report included the following information SBAR, OR Summary, Intake/Output, MAR and Recent Results.

## 2017-10-05 NOTE — PROGRESS NOTES
Problem: Falls - Risk of  Goal: *Absence of Falls  Document Ama Fall Risk and appropriate interventions in the flowsheet.    Outcome: Progressing Towards Goal  Fall Risk Interventions:  Mobility Interventions: Patient to call before getting OOB           Medication Interventions: Patient to call before getting OOB, Teach patient to arise slowly     Elimination Interventions: Call light in reach, Patient to call for help with toileting needs

## 2017-10-06 VITALS
SYSTOLIC BLOOD PRESSURE: 106 MMHG | BODY MASS INDEX: 29.33 KG/M2 | TEMPERATURE: 98.4 F | RESPIRATION RATE: 16 BRPM | DIASTOLIC BLOOD PRESSURE: 66 MMHG | HEIGHT: 69 IN | OXYGEN SATURATION: 93 % | WEIGHT: 198 LBS | HEART RATE: 80 BPM

## 2017-10-06 PROCEDURE — 97530 THERAPEUTIC ACTIVITIES: CPT

## 2017-10-06 PROCEDURE — 74011250637 HC RX REV CODE- 250/637: Performed by: NURSE PRACTITIONER

## 2017-10-06 PROCEDURE — 97535 SELF CARE MNGMENT TRAINING: CPT

## 2017-10-06 PROCEDURE — 74011250636 HC RX REV CODE- 250/636: Performed by: ORTHOPAEDIC SURGERY

## 2017-10-06 RX ORDER — DEXTROSE, SODIUM CHLORIDE, AND POTASSIUM CHLORIDE 5; .45; .15 G/100ML; G/100ML; G/100ML
INJECTION INTRAVENOUS
Status: DISPENSED
Start: 2017-10-06 | End: 2017-10-06

## 2017-10-06 RX ADMIN — HYDROMORPHONE HYDROCHLORIDE 2 MG: 2 INJECTION INTRAMUSCULAR; INTRAVENOUS; SUBCUTANEOUS at 06:28

## 2017-10-06 RX ADMIN — DOCUSATE SODIUM 100 MG: 100 CAPSULE, LIQUID FILLED ORAL at 09:29

## 2017-10-06 RX ADMIN — DEXTROSE MONOHYDRATE, SODIUM CHLORIDE, AND POTASSIUM CHLORIDE 100 ML/HR: 50; 4.5; 1.49 INJECTION, SOLUTION INTRAVENOUS at 07:59

## 2017-10-06 RX ADMIN — HYDROMORPHONE HYDROCHLORIDE 2 MG: 2 INJECTION INTRAMUSCULAR; INTRAVENOUS; SUBCUTANEOUS at 09:29

## 2017-10-06 RX ADMIN — CARVEDILOL 6.25 MG: 6.25 TABLET, FILM COATED ORAL at 06:28

## 2017-10-06 RX ADMIN — Medication 10 ML: at 06:31

## 2017-10-06 RX ADMIN — HYDROMORPHONE HYDROCHLORIDE 2 MG: 2 INJECTION INTRAMUSCULAR; INTRAVENOUS; SUBCUTANEOUS at 03:02

## 2017-10-06 NOTE — PROGRESS NOTES
Problem: Mobility Impaired (Adult and Pediatric)  Goal: *Acute Goals and Plan of Care (Insert Text)  Physical Therapy Goals  Initiated 10/4/2017 and to be accomplished within 7 day(s)  1. Patient will move from supine to sit and sit to supine , scoot up and down and roll side to side in bed with modified independence. 2. Patient will transfer from bed to chair and chair to bed with modified independence using the least restrictive device. 3. Patient will perform sit to stand with modified independence. 4. Patient will ambulate with modified independence for 300 feet with the least restrictive device. 5. Patient will ascend/descend 3 stairs with handrail(s) with modified independence. Outcome: Progressing Towards Goal  PHYSICAL THERAPY TREATMENT     Patient: Graham Schneider (7 y.o. 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 ASSESSMENT:  Pt sitting EOB with LSO donned. RW delivered to room, adjusted to appropriate height for PT. Still reports pain in L thigh/groin area 8-9/10. Unable to stand fully erect. Ambulated with RW, VC to ambulate within IGNACIA of RW for posture and safety. Decreased step height and length, decreased pace, multiple standing breaks, no LOB or path deviations. Pt left sitting EOB, provided with apple juice per request.     Education:lumbar precautions, RW mgmt and safety  Progression toward goals:  [ ]      Improving appropriately and progressing toward goals  [X]      Improving slowly and progressing toward goals  [ ]      Not making progress toward goals and plan of care will be adjusted       PLAN:  Patient continues to benefit from skilled intervention to address the above impairments. Continue treatment per established plan of care. Discharge Recommendations:  Home Health  Further Equipment Recommendations for Discharge:  rolling walker delivered and adjusted       SUBJECTIVE:   Patient stated This leg still hurts.       OBJECTIVE DATA SUMMARY:   Critical Behavior:  Neurologic State: Alert  Orientation Level: Oriented X4  Cognition: Follows commands  Safety/Judgement: Awareness of environment, Fall prevention  Functional Mobility Training:  Transfers:  Sit to Stand: Stand-by asssistance  Stand to Sit: Independent  Balance:  Sitting: Intact  Sitting - Static: Good (unsupported)  Sitting - Dynamic: Good (unsupported)  Standing: Impaired; With support  Standing - Static: Fair  Standing - Dynamic : Fair  Ambulation/Gait Training:  Distance (ft): 400 Feet (ft)  Assistive Device: Brace/Splint;Gait belt;Walker, rolling  Ambulation - Level of Assistance: Stand-by asssistance  Gait Abnormalities: Antalgic;Decreased step clearance  Speed/Su: Slow  Pain:  Pre:8-9  Post:8  Pain Scale 1: Numeric (0 - 10)     Pain Location 1: Hip  Pain Orientation 1: Left  Pain Description 1: Aching     Activity Tolerance:   Fair  Please refer to the flowsheet for vital signs taken during this treatment.   After treatment:   [ ] Patient left in no apparent distress sitting up in chair  [X] Patient left in no apparent distress in bed  [X] Call bell left within reach  [X] Nursing notified  [ ] Caregiver present  [ ] Bed alarm activated      Aida Kaplan PTA   Time Calculation: 25 mins

## 2017-10-06 NOTE — DISCHARGE INSTRUCTIONS
High Blood Pressure: Care Instructions  Your Care Instructions  If your blood pressure is usually above 140/90, you have high blood pressure, or hypertension. That means the top number is 140 or higher or the bottom number is 90 or higher, or both. Despite what a lot of people think, high blood pressure usually doesn't cause headaches or make you feel dizzy or lightheaded. It usually has no symptoms. But it does increase your risk for heart attack, stroke, and kidney or eye damage. The higher your blood pressure, the more your risk increases. Your doctor will give you a goal for your blood pressure. Your goal will be based on your health and your age. An example of a goal is to keep your blood pressure below 140/90. Lifestyle changes, such as eating healthy and being active, are always important to help lower blood pressure. You might also take medicine to reach your blood pressure goal.  Follow-up care is a key part of your treatment and safety. Be sure to make and go to all appointments, and call your doctor if you are having problems. It's also a good idea to know your test results and keep a list of the medicines you take. How can you care for yourself at home? Medical treatment  · If you stop taking your medicine, your blood pressure will go back up. You may take one or more types of medicine to lower your blood pressure. Be safe with medicines. Take your medicine exactly as prescribed. Call your doctor if you think you are having a problem with your medicine. · Talk to your doctor before you start taking aspirin every day. Aspirin can help certain people lower their risk of a heart attack or stroke. But taking aspirin isn't right for everyone, because it can cause serious bleeding. · See your doctor regularly. You may need to see the doctor more often at first or until your blood pressure comes down.   · If you are taking blood pressure medicine, talk to your doctor before you take decongestants or anti-inflammatory medicine, such as ibuprofen. Some of these medicines can raise blood pressure. · Learn how to check your blood pressure at home. Lifestyle changes  · Stay at a healthy weight. This is especially important if you put on weight around the waist. Losing even 10 pounds can help you lower your blood pressure. · If your doctor recommends it, get more exercise. Walking is a good choice. Bit by bit, increase the amount you walk every day. Try for at least 30 minutes on most days of the week. You also may want to swim, bike, or do other activities. · Avoid or limit alcohol. Talk to your doctor about whether you can drink any alcohol. · Try to limit how much sodium you eat to less than 2,300 milligrams (mg) a day. Your doctor may ask you to try to eat less than 1,500 mg a day. · Eat plenty of fruits (such as bananas and oranges), vegetables, legumes, whole grains, and low-fat dairy products. · Lower the amount of saturated fat in your diet. Saturated fat is found in animal products such as milk, cheese, and meat. Limiting these foods may help you lose weight and also lower your risk for heart disease. · Do not smoke. Smoking increases your risk for heart attack and stroke. If you need help quitting, talk to your doctor about stop-smoking programs and medicines. These can increase your chances of quitting for good. When should you call for help? Call 911 anytime you think you may need emergency care. This may mean having symptoms that suggest that your blood pressure is causing a serious heart or blood vessel problem. Your blood pressure may be over 180/110. For example, call 911 if:  · You have symptoms of a heart attack. These may include:  ¨ Chest pain or pressure, or a strange feeling in the chest.  ¨ Sweating. ¨ Shortness of breath. ¨ Nausea or vomiting. ¨ Pain, pressure, or a strange feeling in the back, neck, jaw, or upper belly or in one or both shoulders or arms.   ¨ Lightheadedness or sudden weakness. ¨ A fast or irregular heartbeat. · You have symptoms of a stroke. These may include:  ¨ Sudden numbness, tingling, weakness, or loss of movement in your face, arm, or leg, especially on only one side of your body. ¨ Sudden vision changes. ¨ Sudden trouble speaking. ¨ Sudden confusion or trouble understanding simple statements. ¨ Sudden problems with walking or balance. ¨ A sudden, severe headache that is different from past headaches. · You have severe back or belly pain. Do not wait until your blood pressure comes down on its own. Get help right away. Call your doctor now or seek immediate care if:  · Your blood pressure is much higher than normal (such as 180/110 or higher), but you don't have symptoms. · You think high blood pressure is causing symptoms, such as:  ¨ Severe headache. ¨ Blurry vision. Watch closely for changes in your health, and be sure to contact your doctor if:  · Your blood pressure measures 140/90 or higher at least 2 times. That means the top number is 140 or higher or the bottom number is 90 or higher, or both. · You think you may be having side effects from your blood pressure medicine. · Your blood pressure is usually normal, but it goes above normal at least 2 times. Where can you learn more? Go to http://joseph-senait.info/. Enter X576 in the search box to learn more about \"High Blood Pressure: Care Instructions. \"  Current as of: August 8, 2016  Content Version: 11.3  © 4328-2948 [employer]. Care instructions adapted under license by [employer] (which disclaims liability or warranty for this information). If you have questions about a medical condition or this instruction, always ask your healthcare professional. [place] [address] any warranty or liability for your use of this information.     DISCHARGE SUMMARY from Nurse    The following personal items are in your possession at time of discharge:    Dental Appliances: Uppers  Visual Aid: None     Home Medications: None  Jewelry: Watch, Necklace, With patient  Clothing: Shirt, Shorts, Footwear, Undergarments  Other Valuables: Keys  Personal Items Sent to Safe:  (and BACK BRACE LOCKED UP)          PATIENT INSTRUCTIONS:    After general anesthesia or intravenous sedation, for 24 hours or while taking prescription Narcotics:  · Limit your activities  · Do not drive and operate hazardous machinery  · Do not make important personal or business decisions  · Do  not drink alcoholic beverages  · If you have not urinated within 8 hours after discharge, please contact your surgeon on call. Report the following to your surgeon:  · Excessive pain, swelling, redness or odor of or around the surgical area  · Temperature over 100.5  · Nausea and vomiting lasting longer than 4 hours or if unable to take medications  · Any signs of decreased circulation or nerve impairment to extremity: change in color, persistent  numbness, tingling, coldness or increase pain  · Any questions        What to do at Home:  Recommended activity: Activity as tolerated. *  Please give a list of your current medications to your Primary Care Provider. *  Please update this list whenever your medications are discontinued, doses are      changed, or new medications (including over-the-counter products) are added. *  Please carry medication information at all times in case of emergency situations. These are general instructions for a healthy lifestyle:    No smoking/ No tobacco products/ Avoid exposure to second hand smoke    Surgeon General's Warning:  Quitting smoking now greatly reduces serious risk to your health.     Obesity, smoking, and sedentary lifestyle greatly increases your risk for illness    A healthy diet, regular physical exercise & weight monitoring are important for maintaining a healthy lifestyle    You may be retaining fluid if you have a history of heart failure or if you experience any of the following symptoms:  Weight gain of 3 pounds or more overnight or 5 pounds in a week, increased swelling in our hands or feet or shortness of breath while lying flat in bed. Please call your doctor as soon as you notice any of these symptoms; do not wait until your next office visit. Recognize signs and symptoms of STROKE:    F-face looks uneven    A-arms unable to move or move unevenly    S-speech slurred or non-existent    T-time-call 911 as soon as signs and symptoms begin-DO NOT go       Back to bed or wait to see if you get better-TIME IS BRAIN. Warning Signs of HEART ATTACK     Call 911 if you have these symptoms:   Chest discomfort. Most heart attacks involve discomfort in the center of the chest that lasts more than a few minutes, or that goes away and comes back. It can feel like uncomfortable pressure, squeezing, fullness, or pain.  Discomfort in other areas of the upper body. Symptoms can include pain or discomfort in one or both arms, the back, neck, jaw, or stomach.  Shortness of breath with or without chest discomfort.  Other signs may include breaking out in a cold sweat, nausea, or lightheadedness. Don't wait more than five minutes to call 911 - MINUTES MATTER! Fast action can save your life. Calling 911 is almost always the fastest way to get lifesaving treatment. Emergency Medical Services staff can begin treatment when they arrive -- up to an hour sooner than if someone gets to the hospital by car. The discharge information has been reviewed with the patient. The patient verbalized understanding. Discharge medications reviewed with the patient and appropriate educational materials and side effects teaching were provided.

## 2017-10-06 NOTE — PROGRESS NOTES
[facility]  Hospitalist Division        Inpatient Daily Progress Note    Daily progress Note    Patient: Barb Adhikari MRN: [id number]  CSN: [id number]    YOB: 1960  Age: 55 y.o. Sex: male    DOA: 10/4/2017 LOS:  LOS: 2 days                    Chief Complaint: Back pain       Subjective:      Sitting edge of bed. Continues to complain of incisional pain. In NAD. Objective:      Visit Vitals    BP 95/53 (BP 1 Location: Left arm, BP Patient Position: Sitting)    Pulse 80    Temp 98.4 °F (36.9 °C)    Resp 16    Ht 5' 9\" (1.753 m)    Wt 89.8 kg (198 lb)    SpO2 94%    BMI 29.24 kg/m2           Physical Exam:  General appearance: alert, cooperative, no distress, appears stated age  Lungs: clear to auscultation bilaterally, no wheezes or rhonchi   Heart: regular rate and rhythm, S1, S2 normal, no murmur, click, rub or gallop  Abdomen: soft, non tender, non distended. Normoactive bowel sounds  Extremities: extremities normal, atraumatic, no cyanosis or edema. BLE compartments soft, non tender   Skin: Skin color, texture, turgor normal. No rashes or lesions  Neurologic: Right 5/5, Left 5/5   PSY: appropriately behaved      Intake and Output:  Current Shift:  10/06 0701 - 10/06 1900  In: 120 [P.O.:120]  Out: 250 [Urine:250]  Last three shifts:  10/04 1901 - 10/06 0700  In: 2140 [P.O.:2140]  Out: 1055 [Urine:6390]    No results found for this or any previous visit (from the past 24 hour(s)).         Lab Results   Component Value Date/Time    Glucose 135 10/05/2017 10:00 AM    Glucose 87 08/13/2017 04:25 AM    Glucose 122 08/11/2017 06:00 AM        Assessment/Plan:     Patient Active Problem List   Diagnosis Code    Right renal mass N28.89    ADD (attention deficit disorder) F98.8    Chronic left-sided back pain M54.9, G89.29    DDD (degenerative disc disease), thoracolumbar M51.35    Essential hypertension I10    H/O traumatic brain injury Z87.820    High risk medications (not anticoagulants) long-term use Z79.899    Paroxysmal atrial fibrillation (HCC) I48.0    Primary insomnia F51.01    Seizures (HCC) R56.9    Renal mass N28.89    Renal cell cancer (HCC) C64.9    Lumbar stenosis M48.061       A/P:  - S/p Lumbar decompression L4-S1 with bilateral exploration and decompression of the L4-5 and S1 nerve roots with foraminotomy and  partial facetectomy: mobilization per Ortho   - Afib: resume Eliquis when ok with Ortho   - HTN: Coreg, Lisinopril  - Asthma: Albuterol PRN   - Hx seizure: continue Tegretol   - DVT prophylaxis: SCDs   - Stable for discharge from Medicine standpoint         Vince Madison, JOLENE-BC  [address]  Hospitalist Division  Pager:  [phone]  Office:  [phone]

## 2017-10-06 NOTE — PROGRESS NOTES
1300 pt discharged from facility given discharge instructions and script, nurse obtain WC to take pt to front entrance where taxi is awaiting to take pt to [place], once at the entrance security there to take pt to his personal vehicle to obtain items he states he must have prior to leaving facility, pt refuses to go with security to his car, pt becomes extremely loud, agitated, and verbally disruptive, pt does not go to security, re-enters the hospital. Primary nurse and nursing supervisor follows behind pt because pt is post op, gait is unsteady, he refuses to wear orthotic brace, and is extremely impulsive and non compliant. Pt goes to ER parking lot where he gets in vehicle, nurse and nursing supervisor talking to pt and expresses concerns of his and others safety, pt states \"i can drive, just move, I can drive\", pt puts car in reverse and begins to exit parking lot, pt remains in a virtually supine position while operating the vehicle, nurse obtained pt vehicle info, white [address] with tinted windows and license plate number wavecatch plate, both state and local police informed of perils that may arise with this pt driving.  Pt attending aware as well, pfreeman rn

## 2017-10-06 NOTE — PROGRESS NOTES
Problem: Self Care Deficits Care Plan (Adult)  Goal: *Acute Goals and Plan of Care (Insert Text)  Occupational Therapy Goals  Initiated 10/5/2017 within 7 day(s). 1. Patient will perform lower body dressing with modified independence utilizing AE, prn.  2. Patient will perform grooming tasks while standing with supervision with good safety awareness. 3. Patient will perform functional task while standing for 8 minutes with supervision to increase activity tolerance for ADLs. 4. Patient will perform toilet transfers with supervision/set-up. 5. Patient will perform all aspects of toileting with supervision/set-up. 6. Patient will participate in upper extremity therapeutic exercise/activities with supervision/set-up for 8 minutes . 7. Patient will utilize energy conservation techniques during functional activities with minimal verbal cues. Outcome: Progressing Towards Goal  OCCUPATIONAL THERAPY TREATMENT     Patient: Citlali Mckeon (87 y.o. 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 ASSESSMENT:  Pt seated EOB upon entry. Assisted w/ADL dressing tasks in preparation for discharge. Pt impulsive throughout session requiring max vc's to maintain lumbar precautions w/ADLs and functional mobility w/ADL item retrieval. Reviewed importance of maintaining for optimal recovery. Pt requires max vc's for use adaptive equipment w/LB dressing ADLs. (see functional levels below)  EDUCATION Pt educated on use of adaptive equipment w/LB ADLs. Issued reacher.   Progression toward goals:  [ ]          Improving appropriately and progressing toward goals  [X]          Improving slowly and progressing toward goals  [ ]          Not making progress toward goals and plan of care will be adjusted       PLAN:  Patient continues to benefit from skilled intervention to address the above impairments. Continue treatment per established plan of care. Discharge Recommendations:  Home Health  Further Equipment Recommendations for Discharge:  shower chair and rolling walker, RW has been delivered to room       SUBJECTIVE:   Patient stated I'm never coming back to this place again.       OBJECTIVE DATA SUMMARY:         Cognitive/Behavioral Status:  Neurologic State: Agitated, Alert  Orientation Level: Oriented X4  Cognition: Impulsive, Poor safety awareness  Safety/Judgement: Awareness of environment, Fall prevention  Functional Mobility and Transfers for ADLs:              Transfers:  Sit to Stand: Supervision  Balance:  Sitting: Intact  Sitting - Static: Good (unsupported)  Sitting - Dynamic: Fair (occasional) (fair plus)  Standing: With support; Impaired  Standing - Static: Fair (fair plus)  Standing - Dynamic : Fair (fair plus)  ADL Intervention:  Upper Body Dressing Assistance  Orthotics(Brace): Modified independent  Pullover Shirt: Independent     Lower Body Dressing Assistance  Underpants: Supervision/set-up  Leg Crossed Method Used: No  Position Performed: Seated edge of bed  Cues: Don  Adaptive Equipment Used: Reacher     Therapeutic Exercises:   Pt requires max vc's for safety w/functional mobility w/RW and ADL item retrieval     Pain:  Pre Treatment:\"20\"  Post Treatment:\"20\"  Pain Scale 1: Numeric (0 - 10)  Pain Intensity 1: 9  Pain Location 1: Hip  Pain Orientation 1: Left  Pain Description 1: Aching  Pain Intervention(s) 1: Medication (see MAR)     Activity Tolerance:    Fair     Please refer to the flowsheet for vital signs taken during this treatment.   After treatment:   [ ]  Patient left in no apparent distress sitting up in chair  [X]  Patient left in no apparent distress seated EOB  [ ]  Call bell left within reach  [X]  Nursing notified  [ ]  Caregiver present  [ ]  Bed alarm activated     NETO Brush  Time Calculation: 23 mins

## 2017-10-06 NOTE — PROGRESS NOTES
Problem: Falls - Risk of  Goal: *Absence of Falls  Document Ama Fall Risk and appropriate interventions in the flowsheet.    Outcome: Progressing Towards Goal  Fall Risk Interventions:  Mobility Interventions: Patient to call before getting OOB           Medication Interventions: Patient to call before getting OOB     Elimination Interventions: Bed/chair exit alarm, Call light in reach, Patient to call for help with toileting needs

## 2017-10-06 NOTE — PROGRESS NOTES
1925 Pt received after report given from [address] Assessment completed  2115 Pt refusedPercocet  2315 Pt requested his Valium that he takes at HS. Not on MAR on home med sheet. 57 Dori Shultz Paged  [phone] Dr Novoa Counts returned call. Refused to order Valium 15 mg for Pt.  2342 Pt informed.   'I take it every night for sleep'  0130 Pt resting in bed with eyes closed  0302 Pt c/o pain Dilaudid given due to Pt refusing Percocet  0755 Report given to Shaun with [employer]

## 2017-10-06 NOTE — PROGRESS NOTES
Progress Note      Post Operative Day: 2    Assessment:    1. 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 PLAN:    1. Mobilize. Continue P.T.  2.  WBAT  3. Discharge Planning Cleared from a medical and orthopedic standpoint for discharge today           HPI: Anibal Grubbs is a 55 y.o. 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 No new orthopaedic changes. Blood pressure 95/53, pulse 80, temperature 98.4 °F (36.9 °C), resp. rate 16, height 5' 9\" (1.753 m), weight 89.8 kg (198 lb), SpO2 94 %. CBC w/Diff   Lab Results   Component Value Date/Time    WBC 8.8 10/05/2017 10:00 AM    RBC 3.48 (L) 10/05/2017 10:00 AM    HCT 34.0 (L) 10/05/2017 10:00 AM          Physical Assessment:  General: in no apparent distress   Extremities:  Neurovascular intact    Dressing:  Dry   DVT Exam:   No exam evidence to suggest DVT. Compartments soft and NT.           Radiology: none         Reena Barry  10/6/2017  Office [phone]  Cell [phone]

## 2017-10-24 NOTE — ANCILLARY DISCHARGE INSTRUCTIONS
[facility]  Discharge Phone Call       After-Care Discharge Phone Call Questions: no answer     Were you able to get your prescriptions filled? Comment:      [] Yes  []No    Comment if answer is \"No\"   Are you taking your medication(s) as your doctor ordered? Do you understand the purpose of your medications? Comment:    [] Yes  []No    Comment if answer is \"No\"   Are you taking any other medications that are not on the list?  Comment:      [] Yes  []No    Comment if answer is \"Yes\"   Do you have any questions about your medications? Are you aware of potential side effects? Comment:    [] Yes  []No    Comment if answer is \"Yes\"   Did you make your follow-up appointments (if the hospital did not do this before  discharge)? Comment:    [] Yes  []No    Comment if answer is \"No\"   Is there any reason you might not be able to keep your follow-up appointments? Comment:     [] Yes  []No    Comment if answer is \"Yes\"   Do you have any questions about your care plan? Are you aware of what health problems to be alert for? Comment:    [] Yes  []No    Comment if answer is \"Yes\"   Do you have a good understanding of how you should manage your health? Comment:    [] Yes  []No    Comment if answer is \"Yes\"   Do you know which symptoms to watch for that would mean you would need to call your doctor right away? Comment:      [] Yes  []No    Comment if answer is \"No\"   Do you have any questions about the follow up process or any instructions that we have provided? Comment:    [] Yes  []No    Comment if answer is \"Yes\"   Did staff take your preferences into account?         [] Yes  []No    Comment if answer is \"Yes\"

## (undated) DEVICE — SOL IRRIGATION INJ NACL 0.9% 500ML BTL

## (undated) DEVICE — INTENDED FOR TISSUE SEPARATION, AND OTHER PROCEDURES THAT REQUIRE A SHARP SURGICAL BLADE TO PUNCTURE OR CUT.: Brand: BARD-PARKER ® CARBON RIB-BACK BLADES

## (undated) DEVICE — TOWEL SURG W16XL26IN BLU NONFENESTRATED DLX ST 2 PER PK

## (undated) DEVICE — SPONGE GZ W4XL4IN COT 12 PLY TYP VII WVN C FLD DSGN

## (undated) DEVICE — SPONGE LAP 18X18IN STRL -- 5/PK

## (undated) DEVICE — FLOSEAL MATRIX IS INDICATED IN SURGICAL PROCEDURES (OTHER THAN IN OPHTHALMIC) AS AN ADJUNCT TO HEMOSTASIS WHEN CONTROL OF BLEEDING BY LIGATURE OR CONVENTIONALPROCEDURES IS INEFFECTIVE OR IMPRACTICAL.: Brand: FLOSEAL HEMOSTATIC MATRIX

## (undated) DEVICE — REM POLYHESIVE ADULT PATIENT RETURN ELECTRODE: Brand: VALLEYLAB

## (undated) DEVICE — SOL IRR NACL 0.9% 500ML POUR --

## (undated) DEVICE — BRUSH SCRB PCMX NL CLN 12ML --

## (undated) DEVICE — UNIVERSAL FIXATION CANNULA: Brand: VERSAPORT

## (undated) DEVICE — (D)SYR 10ML 1/5ML GRAD NSAF -- PKGING CHANGE USE ITEM 338027

## (undated) DEVICE — SUTURE COAT VCRL PC 5 PRECIS COSM CONVENTIONAL CUT PRIM 3 8 J823H

## (undated) DEVICE — NEEDLE HYPO 25GA L1.5IN BVL ORIENTED ECLIPSE

## (undated) DEVICE — DRAPE THER FLUID WARMING 66X44 IN FLAT SLUSH DBL DISC ORS

## (undated) DEVICE — SUTURE VCRL SZ 0 L54IN ABSRB VLT W/O NDL POLYGLACTIN 910 J616H

## (undated) DEVICE — SUTURE VCRL 0 L27IN ABSRB VLT CT L40MM 1/2 CIR TAPERPOINT J352H

## (undated) DEVICE — STERILE POLYISOPRENE POWDER-FREE SURGICAL GLOVES: Brand: PROTEXIS

## (undated) DEVICE — NEEDLE INSUF L120MM DIA2MM DISP FOR PNEUMOPERI ENDOPATH

## (undated) DEVICE — SPONGE HEMOSTAT CELLULS 4X8IN -- SURGICEL

## (undated) DEVICE — BIPOLAR FORCEPS CORD,BANANA LEADS: Brand: VALLEYLAB

## (undated) DEVICE — STERILE LATEX POWDER-FREE SURGICAL GLOVESWITH NITRILE COATING: Brand: PROTEXIS

## (undated) DEVICE — GAUZE,SPONGE,8"X4",12PLY,XRAY,STRL,LF: Brand: MEDLINE

## (undated) DEVICE — ECHELON FLEX45 POWERED ENDOPATH STAPLER ARTICULATING ENDOSCOPIC LINEAR CUTTER: Brand: ECHELON  FLEX ENDOPATH

## (undated) DEVICE — SOL INJ L R 1000ML BG --

## (undated) DEVICE — TOOL 14MH30 LEGEND 14CM 3MM: Brand: MIDAS REX ™

## (undated) DEVICE — FORCEPS BPLR DIA5MM MACRO JAW LAP ENDOPATH

## (undated) DEVICE — BULB SYRINGE, IRRIGATION WITH PROTECTIVE CAP, 60 CC, INDIVIDUALLY WRAPPED: Brand: DOVER

## (undated) DEVICE — BLADE ASSEMB CLP HAIR FINE --

## (undated) DEVICE — X-RAY SPONGES,12 PLY: Brand: DERMACEA

## (undated) DEVICE — Device

## (undated) DEVICE — SHEET,DRAPE,70X100,STERILE: Brand: MEDLINE

## (undated) DEVICE — SUTURE PERMAHAND SZ 2-0 L12X18IN NONABSORBABLE BLK SILK A185H

## (undated) DEVICE — PREP SKN DURAPREP 26ML APPL --

## (undated) DEVICE — TRAY CATH 16F URIN MTR LTX -- CONVERT TO ITEM 363111

## (undated) DEVICE — SEALER LAP L37CM SHFT DIA10MM TISS FUS HAND/FOOT SWCH BLNT

## (undated) DEVICE — VISUALIZATION SYSTEM: Brand: CLEARIFY

## (undated) DEVICE — 3M™ IOBAN™ 2 ANTIMICROBIAL INCISE DRAPE 6640EZ: Brand: IOBAN™ 2

## (undated) DEVICE — CLIP APPLIER WITH CLIP LOGIC TECHNOLOGY: Brand: ENDO CLIP III

## (undated) DEVICE — RELOAD STPL L60MM H1-2.6MM MESENTERY THN TISS WHT 6 ROW

## (undated) DEVICE — DEPAUL LAP CHOLE PACK: Brand: MEDLINE INDUSTRIES, INC.

## (undated) DEVICE — 3M™ TEGADERM™ TRANSPARENT FILM DRESSING FRAME STYLE, 1626W, 4 IN X 4-3/4 IN (10 CM X 12 CM), 50/CT 4CT/CASE: Brand: 3M™ TEGADERM™

## (undated) DEVICE — SUTURE ABSORBABLE BRAIDED 2-0 CT-1 27 IN UD VICRYL J259H

## (undated) DEVICE — LAPAROSCOPIC SCISSORS: Brand: EPIX LAPAROSCOPIC SCISSORS

## (undated) DEVICE — RELOAD STPL L45MM H1-2.6MM MESENTERY THN TISS WHT GRIPPING

## (undated) DEVICE — SPINE PACK DEPAUL: Brand: MEDLINE INDUSTRIES, INC.

## (undated) DEVICE — PAD,ABDOMINAL,5"X9",STERILE,LF,1/PK: Brand: MEDLINE INDUSTRIES, INC.

## (undated) DEVICE — SUTURE PDS II SZ 1 L27IN ABSRB VLT CT-1 L36MM 1/2 CIR Z341H

## (undated) DEVICE — 3M™ STERI-STRIP™ REINFORCED ADHESIVE SKIN CLOSURES, R1548, 1 IN X 5 IN (25 MM X 125 MM), 4 STRIPS/ENVELOPE: Brand: 3M™ STERI-STRIP™

## (undated) DEVICE — 3M™ IOBAN™ 2 ANTIMICROBIAL INCISE DRAPE 6650EZ: Brand: IOBAN™ 2

## (undated) DEVICE — THE MILL DISPOSABLE - FINE

## (undated) DEVICE — INSTRMT SET WND CLSR SUT PASS --

## (undated) DEVICE — SUTURE VCRL SZ 0 L36IN ABSRB UD L36MM CT-1 1/2 CIR J946H

## (undated) DEVICE — DEVON™ KNEE AND BODY STRAP 60" X 3" (1.5 M X 7.6 CM): Brand: DEVON

## (undated) DEVICE — GOWN,SIRUS,NONRNF,SETINSLV,2XL,18/CS: Brand: MEDLINE

## (undated) DEVICE — SUTURE PERMAHAND SZ 2-0 L30IN 10X30IN TIE NONABSORBABLE BLK SA85H

## (undated) DEVICE — NDL PRT INJ NSAF BLNT 18GX1.5 --

## (undated) DEVICE — 12FR FRAZIER SUCTION HANDLE: Brand: CARDINAL HEALTH

## (undated) DEVICE — SNAP KOVER: Brand: UNBRANDED

## (undated) DEVICE — DERMABOND SKIN ADH 0.7ML -- DERMABOND ADVANCED 12/BX

## (undated) DEVICE — CODMAN® SURGICAL STRIPS 1/2" X 6" (13MM X 152MM): Brand: CODMAN®

## (undated) DEVICE — PAD PREP ALCOHOL LG STERILE -- CONVERT TO ITEM 305014

## (undated) DEVICE — SUTURE VCRL SZ 4-0 L27IN ABSRB UD L19MM PS-2 3/8 CIR PRIM J426H

## (undated) DEVICE — (D)PREP SKN CHLRAPRP APPL 26ML -- CONVERT TO ITEM 371833

## (undated) DEVICE — SYR LR LCK 1ML GRAD NSAF 30ML --

## (undated) DEVICE — [HIGH FLOW INSUFFLATOR,  DO NOT USE IF PACKAGE IS DAMAGED,  KEEP DRY,  KEEP AWAY FROM SUNLIGHT,  PROTECT FROM HEAT AND RADIOACTIVE SOURCES.]: Brand: PNEUMOSURE

## (undated) DEVICE — SUTURE COAT VCRL SZ 0 L18IN ABSRB UD CTX L48MM 1/2 CIR J724D

## (undated) DEVICE — BLADELESS OPTICAL TROCAR WITH FIXATION CANNULA: Brand: VERSAPORT

## (undated) DEVICE — MAYO STAND COVER: Brand: CONVERTORS

## (undated) DEVICE — TRAY CATH OD16FR SIL URIN M STATLOK STBL DEV SURSTP

## (undated) DEVICE — PACKING 8004050 NEURAY 200PK 7X152MM: Brand: NEURAY ®

## (undated) DEVICE — SUTURE VCRL SZ 2-0 L18IN ABSRB UD CT-1 L36MM 1/2 CIR J839D

## (undated) DEVICE — X-RAY DETECTABLE SPONGES,12 PLY: Brand: VISTEC

## (undated) DEVICE — MAGNETIC INSTRUMENT PAD 10" X 16"; MEDIUM; DISPOSABLE: Brand: CARDINAL HEALTH

## (undated) DEVICE — BLADELESS OPTICAL TROCAR WITH FIXATION CANNULA: Brand: VERSAONE

## (undated) DEVICE — SUTURE VCRL SZ 0 L18IN ABSRB VLT L40MM CT 1/2 CIR J752D

## (undated) DEVICE — PACKING 8004051 NEURAY 200PK 13X152MM: Brand: NEURAY ®

## (undated) DEVICE — GRAFT DELIVERY KIT

## (undated) DEVICE — SUTURE VCRL SZ 0 L36IN ABSRB UD L48MM CTX 1/2 CIR J978H

## (undated) DEVICE — CODMAN® SURGICAL STRIPS 1/4" X 6" (6MM X 152MM): Brand: CODMAN®

## (undated) DEVICE — DISPOSABLE SUCTION/IRRIGATOR TUBE SET WITH TIP: Brand: AHTO

## (undated) DEVICE — ACCESS PLATFORM FOR MINIMALLY INVASIVE SURGERY.: Brand: GELPORT® LAPAROSCOPIC  SYSTEM

## (undated) DEVICE — CARTRIDGE CLP LIG HEMLOK GRN --

## (undated) DEVICE — 3M™ DURAPORE™ SURGICAL TAPE 1538-0, 1/2 INCH X 10 YARD (1,25CM X 9,1M), 24 ROLLS/BOX: Brand: 3M™ DURAPORE™

## (undated) DEVICE — ELECTRODE BLDE L4IN NONINSULATED EDGE

## (undated) DEVICE — KENDALL SCD EXPRESS SLEEVES, KNEE LENGTH, MEDIUM: Brand: KENDALL SCD

## (undated) DEVICE — SYR 10ML CTRL LR LCK NSAF LF --

## (undated) DEVICE — SYRINGE BLB 50CC IRRIG PLIABLE FNGR FLNG GRAD FLSK DISP

## (undated) DEVICE — TROCAR ENDOSCP L100MM DIA12MM STBL SL BLDELSS ENDOPATH XCEL